# Patient Record
Sex: FEMALE | Race: WHITE | NOT HISPANIC OR LATINO | Employment: FULL TIME | URBAN - METROPOLITAN AREA
[De-identification: names, ages, dates, MRNs, and addresses within clinical notes are randomized per-mention and may not be internally consistent; named-entity substitution may affect disease eponyms.]

---

## 2019-09-03 ENCOUNTER — INITIAL PRENATAL (OUTPATIENT)
Dept: OBGYN | Facility: CLINIC | Age: 24
End: 2019-09-03
Payer: MEDICAID

## 2019-09-03 VITALS
HEIGHT: 60 IN | DIASTOLIC BLOOD PRESSURE: 64 MMHG | WEIGHT: 131 LBS | SYSTOLIC BLOOD PRESSURE: 98 MMHG | BODY MASS INDEX: 25.72 KG/M2

## 2019-09-03 DIAGNOSIS — Z34.81 ENCOUNTER FOR SUPERVISION OF NORMAL PREGNANCY IN MULTIGRAVIDA IN FIRST TRIMESTER: ICD-10-CM

## 2019-09-03 LAB
INT CON NEG: NEGATIVE
INT CON POS: POSITIVE
POC URINE PREGNANCY TEST: POSITIVE

## 2019-09-03 PROCEDURE — 76815 OB US LIMITED FETUS(S): CPT | Performed by: OBSTETRICS & GYNECOLOGY

## 2019-09-03 PROCEDURE — 8198 PREG CTR PKG RATE (SYSTEM): Performed by: OBSTETRICS & GYNECOLOGY

## 2019-09-03 PROCEDURE — 0500F INITIAL PRENATAL CARE VISIT: CPT | Mod: 25 | Performed by: OBSTETRICS & GYNECOLOGY

## 2019-09-03 PROCEDURE — 81025 URINE PREGNANCY TEST: CPT | Performed by: OBSTETRICS & GYNECOLOGY

## 2019-09-03 RX ORDER — PNV NO.95/FERROUS FUM/FOLIC AC 28MG-0.8MG
1 TABLET ORAL DAILY
Qty: 30 TAB | Refills: 11 | Status: ON HOLD | OUTPATIENT
Start: 2019-09-03 | End: 2019-10-18

## 2019-09-03 RX ORDER — FERROUS SULFATE 325(65) MG
325 TABLET ORAL 3 TIMES DAILY
Qty: 90 TAB | Refills: 4 | Status: ON HOLD | OUTPATIENT
Start: 2019-09-03 | End: 2019-10-18

## 2019-09-03 SDOH — HEALTH STABILITY: MENTAL HEALTH: HOW OFTEN DO YOU HAVE A DRINK CONTAINING ALCOHOL?: NEVER

## 2019-09-03 NOTE — PROGRESS NOTES
Pt. Here for  visit today.  # [phone]  First prenatal care  Pt. States no complaints   +FM   Pharmacy verified  Chaperone offered and not indicated

## 2019-09-03 NOTE — PROGRESS NOTES
Cc: Confirmation of pregnancy    HPI:  The patient is a 24 y.o.  Unknown based upon  No LMP recorded. Patient is pregnant.. She was using no birth control method. This was a planned pregnancy.     She presents for a confirmation of pregnancy.  She reports  fetal movement,  denies  vaginal bleeding,  denies  leakage of fluid,  denies contractions.   She denies nausea/vomiting, denies headache, and denies dysuria.      Review of systems:  Pertinent positives documented in HPI and all other systems reviewed & are negative    Gyn History: Menarche at 12 years old.  Regular cycles every month lasting 6 to 7 days.  Believes her last period was mid January however unsure.    Pregnancy related complications:  1.  Late to prenatal care    OB History    Para Term  AB Living   4 3 3     3   SAB TAB Ectopic Molar Multiple Live Births             3      # Outcome Date GA Lbr Amadou/2nd Weight Sex Delivery Anes PTL Lv   4 Current            3 Term 18 40w0d  2.722 kg (6 lb) F Vag-Spont  N MARTINA   2 Term 04/15/17 40w0d  3.175 kg (7 lb) M Vag-Spont  N MARTINA   1 Term 08/08/15 40w0d  2.722 kg (6 lb) F Vag-Spont  N MARTINA     History reviewed. No pertinent past medical history.  History reviewed. No pertinent surgical history.  Social History     Socioeconomic History   • Marital status: Single     Spouse name: Not on file   • Number of children: Not on file   • Years of education: Not on file   • Highest education level: Not on file   Occupational History   • Not on file   Social Needs   • Financial resource strain: Not on file   • Food insecurity:     Worry: Not on file     Inability: Not on file   • Transportation needs:     Medical: Not on file     Non-medical: Not on file   Tobacco Use   • Smoking status: Never Smoker   • Smokeless tobacco: Never Used   Substance and Sexual Activity   • Alcohol use: Never     Frequency: Never   • Drug use: Never   • Sexual activity: Not Currently     Partners: Male     Comment:  None    Lifestyle   • Physical activity:     Days per week: Not on file     Minutes per session: Not on file   • Stress: Not on file   Relationships   • Social connections:     Talks on phone: Not on file     Gets together: Not on file     Attends [employer]ism service: Not on file     Active member of club or organization: Not on file     Attends meetings of clubs or organizations: Not on file     Relationship status: Not on file   • Intimate partner violence:     Fear of current or ex partner: Not on file     Emotionally abused: Not on file     Physically abused: Not on file     Forced sexual activity: Not on file   Other Topics Concern   • Not on file   Social History Narrative   • Not on file     History reviewed. No pertinent family history.  Allergies:   Allergies as of 09/03/2019   • (No Known Allergies)       PE:    There were no vitals taken for this visit.      General:appears stated age  Head: normocephalic, non-tender  Neck: neck is supple  Abdomen: Bowel sounds positive, nondistended, soft, nontender x4, no rebound or guarding. No organomegaly. No masses.  Female GYN: deferred  Skin: No rashes, or ulcers or lesions seen  Psychiatric: Patient shows appropriate affect, is alert and oriented x3, intact judgment and insight.    transabdominal scan and per my read:    Indication: dating in pregnancy. Normal in the second week of Jan.     Findings: benitez intrauterine pregnancy. ADA is 14.4 cm.  Placenta is anterior.  Benitez pregnancy in vertex position.    BPD is 7.46 cm.  GA 29 weeks 6 days  HC is 29.3 to 9 cm.  GA 32 weeks 3 days.  AC is 29.43 cm.  GA 33 weeks 3 days.  FL is 6.3 cm.  EGA is 32.4 weeks.  Sex is likely male    Impression: Average gestational age is 32 weeks 1 day.  EDC by ultrasound today of 10/29/2019.    DATING: 10/29/2018 by late ultrasound as per ACOG guidelines    A/P:   1. Encounter for supervision of normal pregnancy in multigravida in first trimester  PRENATAL PROFILE 3 + HIV     US-OB 2ND 3RD TRI COMPLETE    GLUCOSE 1HR GESTATIONAL     Spent 20 minutes in face-to-face patient contact in which greater than 50% of that visit was spent in counseling/coordination of care of newly diagnosed pregnancy.  -Late prenatal care: Discussed too late for aneuploidy screening.  -1 hour GTT given today  -New OB labs given today  -Defer Pap smear to postpartum  -Anatomy scan today  SAB precautions discussed  F/u in 2 weeks for new OB visit  Increase water intake and encouraged healthy nutrition.  Begin prenatal vitamins and iron supplements as patient has a history of anemia in her previous pregnancies.

## 2019-09-10 ENCOUNTER — HOSPITAL ENCOUNTER (OUTPATIENT)
Dept: LAB | Facility: MEDICAL CENTER | Age: 24
End: 2019-09-10
Attending: OBSTETRICS & GYNECOLOGY
Payer: COMMERCIAL

## 2019-09-10 DIAGNOSIS — Z34.81 ENCOUNTER FOR SUPERVISION OF NORMAL PREGNANCY IN MULTIGRAVIDA IN FIRST TRIMESTER: ICD-10-CM

## 2019-09-10 LAB
ABO GROUP BLD: NORMAL
BASOPHILS # BLD AUTO: 0.4 % (ref 0–1.8)
BASOPHILS # BLD: 0.03 K/UL (ref 0–0.12)
BLD GP AB SCN SERPL QL: NORMAL
EOSINOPHIL # BLD AUTO: 0.04 K/UL (ref 0–0.51)
EOSINOPHIL NFR BLD: 0.5 % (ref 0–6.9)
ERYTHROCYTE [DISTWIDTH] IN BLOOD BY AUTOMATED COUNT: 39.6 FL (ref 35.9–50)
HBV SURFACE AG SER QL: NEGATIVE
HCT VFR BLD AUTO: 28.4 % (ref 37–47)
HGB BLD-MCNC: 8.9 G/DL (ref 12–16)
HIV 1+2 AB+HIV1 P24 AG SERPL QL IA: NON REACTIVE
IMM GRANULOCYTES # BLD AUTO: 0.03 K/UL (ref 0–0.11)
IMM GRANULOCYTES NFR BLD AUTO: 0.4 % (ref 0–0.9)
LYMPHOCYTES # BLD AUTO: 2.22 K/UL (ref 1–4.8)
LYMPHOCYTES NFR BLD: 28 % (ref 22–41)
MCH RBC QN AUTO: 26.8 PG (ref 27–33)
MCHC RBC AUTO-ENTMCNC: 31.3 G/DL (ref 33.6–35)
MCV RBC AUTO: 85.5 FL (ref 81.4–97.8)
MONOCYTES # BLD AUTO: 0.42 K/UL (ref 0–0.85)
MONOCYTES NFR BLD AUTO: 5.3 % (ref 0–13.4)
NEUTROPHILS # BLD AUTO: 5.2 K/UL (ref 2–7.15)
NEUTROPHILS NFR BLD: 65.4 % (ref 44–72)
NRBC # BLD AUTO: 0 K/UL
NRBC BLD-RTO: 0 /100 WBC
PLATELET # BLD AUTO: 204 K/UL (ref 164–446)
PMV BLD AUTO: 11.8 FL (ref 9–12.9)
RBC # BLD AUTO: 3.32 M/UL (ref 4.2–5.4)
RH BLD: NORMAL
RUBV AB SER QL: 20.4 IU/ML
TREPONEMA PALLIDUM IGG+IGM AB [PRESENCE] IN SERUM OR PLASMA BY IMMUNOASSAY: NON REACTIVE
WBC # BLD AUTO: 7.9 K/UL (ref 4.8–10.8)

## 2019-09-17 ENCOUNTER — TELEPHONE (OUTPATIENT)
Dept: OBGYN | Facility: CLINIC | Age: 24
End: 2019-09-17

## 2019-09-17 NOTE — TELEPHONE ENCOUNTER
09/17/19 10:51 AM    LM for pt to call back in regards to below. FYI in chart    ----- Message from Nevaeh Rogers C.N.M. sent at 9/11/2019  8:25 AM PDT -----  Please have patient start iron supplementation. 1 time daily for 1 week, then increase to 2 times daily

## 2019-10-18 ENCOUNTER — HOSPITAL ENCOUNTER (INPATIENT)
Facility: MEDICAL CENTER | Age: 24
LOS: 2 days | End: 2019-10-20
Attending: OBSTETRICS & GYNECOLOGY | Admitting: OBSTETRICS & GYNECOLOGY
Payer: MEDICAID

## 2019-10-18 LAB
A1 MICROGLOB PLACENTAL VAG QL: NEGATIVE
AMPHET UR QL SCN: NEGATIVE
BARBITURATES UR QL SCN: NEGATIVE
BASOPHILS # BLD AUTO: 0.3 % (ref 0–1.8)
BASOPHILS # BLD: 0.03 K/UL (ref 0–0.12)
BENZODIAZ UR QL SCN: NEGATIVE
BZE UR QL SCN: NEGATIVE
CANNABINOIDS UR QL SCN: NEGATIVE
EOSINOPHIL # BLD AUTO: 0.03 K/UL (ref 0–0.51)
EOSINOPHIL NFR BLD: 0.3 % (ref 0–6.9)
ERYTHROCYTE [DISTWIDTH] IN BLOOD BY AUTOMATED COUNT: 41.2 FL (ref 35.9–50)
HCT VFR BLD AUTO: 34 % (ref 37–47)
HGB BLD-MCNC: 10.5 G/DL (ref 12–16)
HOLDING TUBE BB 8507: NORMAL
IMM GRANULOCYTES # BLD AUTO: 0.03 K/UL (ref 0–0.11)
IMM GRANULOCYTES NFR BLD AUTO: 0.3 % (ref 0–0.9)
LYMPHOCYTES # BLD AUTO: 2 K/UL (ref 1–4.8)
LYMPHOCYTES NFR BLD: 16.9 % (ref 22–41)
MCH RBC QN AUTO: 25.2 PG (ref 27–33)
MCHC RBC AUTO-ENTMCNC: 30.9 G/DL (ref 33.6–35)
MCV RBC AUTO: 81.7 FL (ref 81.4–97.8)
METHADONE UR QL SCN: NEGATIVE
MONOCYTES # BLD AUTO: 0.52 K/UL (ref 0–0.85)
MONOCYTES NFR BLD AUTO: 4.4 % (ref 0–13.4)
NEUTROPHILS # BLD AUTO: 9.2 K/UL (ref 2–7.15)
NEUTROPHILS NFR BLD: 77.8 % (ref 44–72)
NRBC # BLD AUTO: 0 K/UL
NRBC BLD-RTO: 0 /100 WBC
OPIATES UR QL SCN: NEGATIVE
OXYCODONE UR QL SCN: NEGATIVE
PCP UR QL SCN: NEGATIVE
PLATELET # BLD AUTO: 232 K/UL (ref 164–446)
PMV BLD AUTO: 11.4 FL (ref 9–12.9)
PROPOXYPH UR QL SCN: NEGATIVE
RBC # BLD AUTO: 4.16 M/UL (ref 4.2–5.4)
WBC # BLD AUTO: 11.8 K/UL (ref 4.8–10.8)

## 2019-10-18 PROCEDURE — 304965 HCHG RECOVERY SERVICES

## 2019-10-18 PROCEDURE — 84112 EVAL AMNIOTIC FLUID PROTEIN: CPT

## 2019-10-18 PROCEDURE — 36415 COLL VENOUS BLD VENIPUNCTURE: CPT

## 2019-10-18 PROCEDURE — 700111 HCHG RX REV CODE 636 W/ 250 OVERRIDE (IP)

## 2019-10-18 PROCEDURE — A9270 NON-COVERED ITEM OR SERVICE: HCPCS | Performed by: OBSTETRICS & GYNECOLOGY

## 2019-10-18 PROCEDURE — 770002 HCHG ROOM/CARE - OB PRIVATE (112)

## 2019-10-18 PROCEDURE — 80307 DRUG TEST PRSMV CHEM ANLYZR: CPT

## 2019-10-18 PROCEDURE — 85025 COMPLETE CBC W/AUTO DIFF WBC: CPT

## 2019-10-18 PROCEDURE — 59409 OBSTETRICAL CARE: CPT

## 2019-10-18 PROCEDURE — 700102 HCHG RX REV CODE 250 W/ 637 OVERRIDE(OP): Performed by: NURSE PRACTITIONER

## 2019-10-18 PROCEDURE — A9270 NON-COVERED ITEM OR SERVICE: HCPCS | Performed by: NURSE PRACTITIONER

## 2019-10-18 PROCEDURE — 700111 HCHG RX REV CODE 636 W/ 250 OVERRIDE (IP): Performed by: OBSTETRICS & GYNECOLOGY

## 2019-10-18 PROCEDURE — 700102 HCHG RX REV CODE 250 W/ 637 OVERRIDE(OP): Performed by: OBSTETRICS & GYNECOLOGY

## 2019-10-18 PROCEDURE — 59409 OBSTETRICAL CARE: CPT | Performed by: NURSE PRACTITIONER

## 2019-10-18 RX ORDER — METHYLERGONOVINE MALEATE 0.2 MG/ML
0.2 INJECTION INTRAVENOUS
Status: DISCONTINUED | OUTPATIENT
Start: 2019-10-18 | End: 2019-10-20 | Stop reason: HOSPADM

## 2019-10-18 RX ORDER — VITAMIN A ACETATE, BETA CAROTENE, ASCORBIC ACID, CHOLECALCIFEROL, .ALPHA.-TOCOPHEROL ACETATE, DL-, THIAMINE MONONITRATE, RIBOFLAVIN, NIACINAMIDE, PYRIDOXINE HYDROCHLORIDE, FOLIC ACID, CYANOCOBALAMIN, CALCIUM CARBONATE, FERROUS FUMARATE, ZINC OXIDE, CUPRIC OXIDE 3080; 12; 120; 400; 1; 1.84; 3; 20; 22; 920; 25; 200; 27; 10; 2 [IU]/1; UG/1; MG/1; [IU]/1; MG/1; MG/1; MG/1; MG/1; MG/1; [IU]/1; MG/1; MG/1; MG/1; MG/1; MG/1
1 TABLET, FILM COATED ORAL EVERY MORNING
Status: DISCONTINUED | OUTPATIENT
Start: 2019-10-19 | End: 2019-10-20 | Stop reason: HOSPADM

## 2019-10-18 RX ORDER — SODIUM CHLORIDE, SODIUM LACTATE, POTASSIUM CHLORIDE, CALCIUM CHLORIDE 600; 310; 30; 20 MG/100ML; MG/100ML; MG/100ML; MG/100ML
INJECTION, SOLUTION INTRAVENOUS CONTINUOUS
Status: ACTIVE | OUTPATIENT
Start: 2019-10-18 | End: 2019-10-18

## 2019-10-18 RX ORDER — OXYCODONE HYDROCHLORIDE AND ACETAMINOPHEN 5; 325 MG/1; MG/1
1 TABLET ORAL EVERY 4 HOURS PRN
Status: DISCONTINUED | OUTPATIENT
Start: 2019-10-18 | End: 2019-10-20 | Stop reason: HOSPADM

## 2019-10-18 RX ORDER — IBUPROFEN 600 MG/1
600 TABLET ORAL EVERY 6 HOURS PRN
Status: DISCONTINUED | OUTPATIENT
Start: 2019-10-18 | End: 2019-10-20 | Stop reason: HOSPADM

## 2019-10-18 RX ORDER — SODIUM CHLORIDE, SODIUM LACTATE, POTASSIUM CHLORIDE, CALCIUM CHLORIDE 600; 310; 30; 20 MG/100ML; MG/100ML; MG/100ML; MG/100ML
INJECTION, SOLUTION INTRAVENOUS PRN
Status: DISCONTINUED | OUTPATIENT
Start: 2019-10-18 | End: 2019-10-20 | Stop reason: HOSPADM

## 2019-10-18 RX ORDER — METHYLERGONOVINE MALEATE 0.2 MG/ML
0.2 INJECTION INTRAVENOUS
Status: DISCONTINUED | OUTPATIENT
Start: 2019-10-18 | End: 2019-10-18 | Stop reason: HOSPADM

## 2019-10-18 RX ORDER — ONDANSETRON 4 MG/1
4 TABLET, ORALLY DISINTEGRATING ORAL EVERY 6 HOURS PRN
Status: CANCELLED | OUTPATIENT
Start: 2019-10-18

## 2019-10-18 RX ORDER — MISOPROSTOL 200 UG/1
800 TABLET ORAL
Status: DISCONTINUED | OUTPATIENT
Start: 2019-10-18 | End: 2019-10-18 | Stop reason: HOSPADM

## 2019-10-18 RX ORDER — DOCUSATE SODIUM 100 MG/1
100 CAPSULE, LIQUID FILLED ORAL 2 TIMES DAILY PRN
Status: DISCONTINUED | OUTPATIENT
Start: 2019-10-18 | End: 2019-10-20 | Stop reason: HOSPADM

## 2019-10-18 RX ORDER — ONDANSETRON 2 MG/ML
4 INJECTION INTRAMUSCULAR; INTRAVENOUS EVERY 6 HOURS PRN
Status: CANCELLED | OUTPATIENT
Start: 2019-10-18

## 2019-10-18 RX ORDER — IBUPROFEN 800 MG/1
800 TABLET ORAL EVERY 6 HOURS PRN
Status: DISCONTINUED | OUTPATIENT
Start: 2019-10-18 | End: 2019-10-20 | Stop reason: HOSPADM

## 2019-10-18 RX ORDER — HYDROCODONE BITARTRATE AND ACETAMINOPHEN 5; 325 MG/1; MG/1
1 TABLET ORAL EVERY 4 HOURS PRN
Status: DISCONTINUED | OUTPATIENT
Start: 2019-10-18 | End: 2019-10-20 | Stop reason: HOSPADM

## 2019-10-18 RX ORDER — BISACODYL 10 MG
10 SUPPOSITORY, RECTAL RECTAL PRN
Status: DISCONTINUED | OUTPATIENT
Start: 2019-10-18 | End: 2019-10-20 | Stop reason: HOSPADM

## 2019-10-18 RX ADMIN — Medication 125 ML/HR: at 16:53

## 2019-10-18 RX ADMIN — HYDROCODONE BITARTRATE AND ACETAMINOPHEN 1 TABLET: 5; 325 TABLET ORAL at 19:46

## 2019-10-18 RX ADMIN — Medication 2000 ML/HR: at 16:01

## 2019-10-18 RX ADMIN — IBUPROFEN 800 MG: 800 TABLET ORAL at 19:46

## 2019-10-18 ASSESSMENT — COPD QUESTIONNAIRES
HAVE YOU SMOKED AT LEAST 100 CIGARETTES IN YOUR ENTIRE LIFE: NO/DON'T KNOW
DURING THE PAST 4 WEEKS HOW MUCH DID YOU FEEL SHORT OF BREATH: NONE/LITTLE OF THE TIME
DO YOU EVER COUGH UP ANY MUCUS OR PHLEGM?: NO/ONLY WITH OCCASIONAL COLDS OR INFECTIONS
IN THE PAST 12 MONTHS DO YOU DO LESS THAN YOU USED TO BECAUSE OF YOUR BREATHING PROBLEMS: DISAGREE/UNSURE
COPD SCREENING SCORE: 0

## 2019-10-18 ASSESSMENT — PATIENT HEALTH QUESTIONNAIRE - PHQ9
SUM OF ALL RESPONSES TO PHQ9 QUESTIONS 1 AND 2: 0
2. FEELING DOWN, DEPRESSED, IRRITABLE, OR HOPELESS: NOT AT ALL
1. LITTLE INTEREST OR PLEASURE IN DOING THINGS: NOT AT ALL

## 2019-10-18 ASSESSMENT — LIFESTYLE VARIABLES
ALCOHOL_USE: NO
EVER_SMOKED: NEVER

## 2019-10-18 NOTE — PROGRESS NOTES
EDC - 10/29/19 EGA - 38.3    1012 - Pt arrived to labor and delivery for UCs since last night. Pt placed in room 220. External monitors in place X2. Category I FHT at this time. VSS. Pt reports good FM. Pt states she has had occ spotting when she wipes and watery discharge.   1016 SVE 4-80/-2. Watery pink discharge noted on glove. POC discussed with pt, all questions answered.   1020 OPAL Birch CNM notified of pt status, orders received for amnisure at this time.  1030 Amnisure collected. POC discussed with pt, all questions answered.  1115 OPAL Birch notified of amnisure negative.   1117 Pt notified amnisure negative, SVE /-2  1130 Pt up to walk the halls at this time. Pt instructed to return to room in 1 hour and we will recheck cervix at that time.  1233 SVE 5/-2  1245 OPAL Birch CNM notifed, orders to walk pt for another hour at this time.  1359 SVE 5-/-2 Dr. Bruce in department, notified of pt status  1406 Dr. Bruce at bedside SVE /-2 orders received for admission at this time  1420 IV started, labs drawn.  1435 Admission procedures completed at this time, 12 hour chart check completed.  1423 Dr. Bruce at bedside AROM clear fluid at this time /-1  1535 Pt states she is feeling pressure, SVE-unchanged  1552 Pt states the baby is coming, OPAL Birch and Dr. Estevez at bedside, SVE Complete/+2, Pt pushing at this time, RN at bedside directing pushing progress.  1556  of viable male infant by Dr. Estevez to maternal abdomen. Apgars 8/9    1601 Placenta delivered intact.  Perineal abrasion noted no repair required at this time.  1615 Fundus firm, lochia Light  1730 Pt ambulated to bathroom in stable condition at this time. Pt able to void. Bobbi pads changed.  1745 Pt transferred to  in stable condition via wheel chair with infant in arms. Report given to Sarita QUEZADA. Infant bands matched x2 cuddles active.

## 2019-10-18 NOTE — L&D DELIVERY NOTE
Vaginal Delivery Procedure Note:    Jennifer Cueto is a 24 y.o. , now Para 4     Weeks of gestation: 38w3d  Diagnosis: Admit for active labor,      of viable male infant at 15:56.  APGARs 8 and 9  Birth weight pending  Nuchal cord: 1x, tight, reduced  Placenta delivered spontaneously intact at 16:01. 3 Vessel cord.  Laceration: hemostatic abrasion to perineum at midline; otherwise no abrasions. No sutures required.  Excellent hemostasis.    Anesthesia - none  Patient tolerated procedure well.

## 2019-10-18 NOTE — CARE PLAN
Problem: Pain  Goal: Alleviation of Pain or a reduction in pain to the patient's comfort goal  Outcome: PROGRESSING AS EXPECTED  Note:   Pt denies need for pain intervention     Problem: Risk for Infection, Impaired Wound Healing  Goal: Remain free from signs and symptoms of infection  Outcome: PROGRESSING AS EXPECTED  Note:   No s/s of infection noted, pt remains afebrile

## 2019-10-18 NOTE — H&P
Obstetrics & Gynecology History & Physical Note    Date of Service  10/18/2019    Chief Complaint  Contractions    History of Presenting Illness  Jennifer Cueto is a 24 y.o.  at 38w3d 10/29/2019, by Ultrasound. No LMP recorded (lmp unknown). Patient is pregnant. She is being admitted for labor management.  Patient had a one outpatient visit.  She denies any bleeding or leaking.  Denies headaches or scotoma.  Reports irregular contractions.    Prenatal care: none      Patient Active Problem List    Diagnosis Date Noted   • Encounter for supervision of normal pregnancy in multigravida in first trimester 2019   No  prenatal care    Obstetric History  OB History    Para Term  AB Living   4 3 3     3   SAB TAB Ectopic Molar Multiple Live Births             3      # Outcome Date GA Lbr Amadou/2nd Weight Sex Delivery Anes PTL Lv   4 Current            3 Term 18 40w0d  2.722 kg (6 lb) F Vag-Spont  N MARTINA   2 Term 04/15/17 40w0d  3.175 kg (7 lb) M Vag-Spont  N MARTINA   1 Term 08/08/15 40w0d  2.722 kg (6 lb) F Vag-Spont  N MARTINA       Gynecologic History  Negative    Review of Systems  ROS all organ systems were reviewed and were negative except for complaints in HPI    Medical History   has no past medical history of Anemia, Anxiety, Arrhythmia, Arthritis, Asthma, Blood transfusion without reported diagnosis, Depression, Diabetes (HCC), GERD (gastroesophageal reflux disease), Hypertension, Kidney disease, Migraine, Seizure (HCC), Stroke ([facility]), or Thyroid disease.    Surgical History   has no past surgical history on file.     Family History  family history is not on file.     Social History   reports that she has never smoked. She has never used smokeless tobacco. She reports that she does not drink alcohol or use drugs.    Allergies  No Known Allergies    Medications  Prior to Admission Medications   Prescriptions Last Dose Informant Patient Reported? Taking?   Prenatal Vit-Fe Fumarate-FA (PRENATAL  VITAMIN) 27-0.8 MG Tab   No No   Sig: Take 1 Tab by mouth every day.   ferrous sulfate 325 (65 Fe) MG tablet   No No   Sig: Take 1 Tab by mouth 3 times a day for 150 days.      Facility-Administered Medications: None       Physical Exam  Vitals:    10/18/19 1014 10/18/19 1015   BP:  108/69   Pulse:  71   Resp: 18    Temp: 35.9 °C (96.7 °F)    TempSrc: Temporal    Weight: 63.5 kg (140 lb)    Height: 1.524 m (5')        General:   alert, cooperative, no distress   Skin:   normal   HEENT:  PERRLA and extraocular movements intact   Lungs:   CTA bilateral, clear to auscultation bilaterally   Heart:   regular rate and rhythm   Breasts:   no skin dimpling or peau d'orange, deferred   Abdomen:  Abdomen soft, non-tender; gravid.   Pelvis: External genitalia: normal general appearance   FHT:  130s BPM Fetal heart variability: moderate.  Fetal heart rate baseline is in the 130s with accelerations and no decelerations.contractions are 3 to 5 minutes apart   [place]: External US([id number])   Presentations: Vertex by SVE   Cervix:     Dilation: 6    Effacement: 90    Station:  -2    Consistency: Soft    Position: Posterior       Laboratory:  Prenatal Results     General (Most Recent Result)     Test Value Reference Range Date Time    ABO O   09/10/19 1031    Rh POS   09/10/19 1031    Antibody screen NEG   09/10/19 1031    HbA1c        Gonorrhea        Chlamydia  by PCR        Chlamydia by DNA        RPR/Syphilus Non Reactive  Non Reactive 09/10/19 1028    HSV 1/2 by PCR (non-serum)        HSV 1/2 (serum)        HSV 1        HSV 2        HPV (16)        HPV (18)        HPV (other)        HBsAg Negative  Negative 09/10/19 1028    HIV-1 HIV-2 Antibodies Non Reactive  Non Reactive 09/10/19 1028    Rubella 20.40 IU/mL  09/10/19 1028    Tb              Pap Smear (Most Recent Result)     Test Value Reference Range Date Time    Pap smear        Pap smear w/HPV        Pap smear w/CTNG        Pap smar w/HPV CTNG        Pap smear (reflex HPV  ACUS)        Pap smear (reflex HPV ASCUS w/CTNG)        Pathology gyn specimen              Urinalysis (Most Recent Result)     Test Value Reference Range Date Time    Urinalysis        Urinalysis (culture if indicated)        POC urinalysis        Urine drug screen  (See Report)    10/18/19 1230    Urine drug screen (w/o conf)        Urine culture ([id number])        Urine culture ([id number])              1st Trimester     Test Value Reference Range Date Time    Hgb        Hct        Fasting Glucose Tolerance        GTT, 1 hour        GTT, 2 hours        GTT, 3 hours              2nd Trimester     Test Value Reference Range Date Time    Hgb        Hct        Urinalysis        Urine Culture        AST        ALT        Uric Acid        Fasting Glucose Tolerance        GTT, 1 hour        GTT, 2 hours        GTT, 3 hours        Urine Protein/Creatinine Ratio              3rd Trimester     Test Value Reference Range Date Time    Hgb 8.9 g/dL 12.0 - 16.0 09/10/19 1028    Hct 28.4 % 37.0 - 47.0 09/10/19 1028    Platelet count 204 K/uL 164 - 446 09/10/19 1028    GBS (TORRES BROTH)        GBS (Direct)        Urinalysis        Urine Culture        Urine Drug Screen        Urine Protein/Creatinine Ratio              Congenital Disease Screening     Test Value Reference Range Date Time    First Trimester Screen        Quad Screen        Sickle Cell        Thalassemia        [facility]        Cystic Fibrosis Carrier Study                      Urinalysis:    No results found     Imaging:        Assessment:  24 y.o.  38w3d who presents with:  Active labor  No prenatal care/1 outpatient visit  Fetal heart rate tracing is category 1  GBS status unknown-no risk factors    Plan:  Will admit for labor management  Labor management and plan of care reviewed    Jim Burce M.D.

## 2019-10-19 LAB
ERYTHROCYTE [DISTWIDTH] IN BLOOD BY AUTOMATED COUNT: 41.7 FL (ref 35.9–50)
HCT VFR BLD AUTO: 26.2 % (ref 37–47)
HGB BLD-MCNC: 8.2 G/DL (ref 12–16)
MCH RBC QN AUTO: 25.9 PG (ref 27–33)
MCHC RBC AUTO-ENTMCNC: 31.3 G/DL (ref 33.6–35)
MCV RBC AUTO: 82.9 FL (ref 81.4–97.8)
PLATELET # BLD AUTO: 193 K/UL (ref 164–446)
PMV BLD AUTO: 11.9 FL (ref 9–12.9)
RBC # BLD AUTO: 3.16 M/UL (ref 4.2–5.4)
WBC # BLD AUTO: 10.9 K/UL (ref 4.8–10.8)

## 2019-10-19 PROCEDURE — 770002 HCHG ROOM/CARE - OB PRIVATE (112)

## 2019-10-19 PROCEDURE — 3E0234Z INTRODUCTION OF SERUM, TOXOID AND VACCINE INTO MUSCLE, PERCUTANEOUS APPROACH: ICD-10-PCS | Performed by: STUDENT IN AN ORGANIZED HEALTH CARE EDUCATION/TRAINING PROGRAM

## 2019-10-19 PROCEDURE — 700111 HCHG RX REV CODE 636 W/ 250 OVERRIDE (IP): Performed by: OBSTETRICS & GYNECOLOGY

## 2019-10-19 PROCEDURE — 3E02340 INTRODUCTION OF INFLUENZA VACCINE INTO MUSCLE, PERCUTANEOUS APPROACH: ICD-10-PCS | Performed by: STUDENT IN AN ORGANIZED HEALTH CARE EDUCATION/TRAINING PROGRAM

## 2019-10-19 PROCEDURE — A9270 NON-COVERED ITEM OR SERVICE: HCPCS | Performed by: OBSTETRICS & GYNECOLOGY

## 2019-10-19 PROCEDURE — 700111 HCHG RX REV CODE 636 W/ 250 OVERRIDE (IP)

## 2019-10-19 PROCEDURE — 90686 IIV4 VACC NO PRSV 0.5 ML IM: CPT | Performed by: OBSTETRICS & GYNECOLOGY

## 2019-10-19 PROCEDURE — 85027 COMPLETE CBC AUTOMATED: CPT

## 2019-10-19 PROCEDURE — 700102 HCHG RX REV CODE 250 W/ 637 OVERRIDE(OP): Performed by: OBSTETRICS & GYNECOLOGY

## 2019-10-19 PROCEDURE — 700102 HCHG RX REV CODE 250 W/ 637 OVERRIDE(OP): Performed by: NURSE PRACTITIONER

## 2019-10-19 PROCEDURE — 90471 IMMUNIZATION ADMIN: CPT

## 2019-10-19 PROCEDURE — 90715 TDAP VACCINE 7 YRS/> IM: CPT

## 2019-10-19 PROCEDURE — 36415 COLL VENOUS BLD VENIPUNCTURE: CPT

## 2019-10-19 PROCEDURE — A9270 NON-COVERED ITEM OR SERVICE: HCPCS | Performed by: NURSE PRACTITIONER

## 2019-10-19 RX ORDER — IBUPROFEN 800 MG/1
800 TABLET ORAL EVERY 6 HOURS PRN
Qty: 90 TAB | Refills: 0 | Status: SHIPPED | OUTPATIENT
Start: 2019-10-19 | End: 2021-05-13

## 2019-10-19 RX ORDER — PSEUDOEPHEDRINE HCL 30 MG
100 TABLET ORAL 2 TIMES DAILY PRN
Qty: 60 CAP | Refills: 0 | Status: SHIPPED | OUTPATIENT
Start: 2019-10-19 | End: 2021-06-15

## 2019-10-19 RX ADMIN — VITAMIN A, VITAMIN C, VITAMIN D, VITAMIN E, THIAMINE, RIBOFLAVIN, NIACIN, VITAMIN B6, FOLIC ACID, VITAMIN B12, CALCIUM, IRON, ZINC, COPPER 1 TABLET: 4000; 120; 400; 22; 1.84; 3; 20; 10; 1; 12; 200; 27; 25; 2 TABLET ORAL at 05:42

## 2019-10-19 RX ADMIN — HYDROCODONE BITARTRATE AND ACETAMINOPHEN 1 TABLET: 5; 325 TABLET ORAL at 20:42

## 2019-10-19 RX ADMIN — INFLUENZA A VIRUS A/BRISBANE/02/2018 IVR-190 (H1N1) ANTIGEN (FORMALDEHYDE INACTIVATED), INFLUENZA A VIRUS A/KANSAS/14/2017 X-327 (H3N2) ANTIGEN (FORMALDEHYDE INACTIVATED), INFLUENZA B VIRUS B/PHUKET/3073/2013 ANTIGEN (FORMALDEHYDE INACTIVATED), AND INFLUENZA B VIRUS B/MARYLAND/15/2016 BX-69A ANTIGEN (FORMALDEHYDE INACTIVATED) 0.5 ML: 15; 15; 15; 15 INJECTION, SUSPENSION INTRAMUSCULAR at 05:42

## 2019-10-19 RX ADMIN — TETANUS TOXOID, REDUCED DIPHTHERIA TOXOID AND ACELLULAR PERTUSSIS VACCINE, ADSORBED 0.5 ML: 5; 2.5; 8; 8; 2.5 SUSPENSION INTRAMUSCULAR at 05:42

## 2019-10-19 RX ADMIN — IBUPROFEN 800 MG: 800 TABLET ORAL at 20:42

## 2019-10-19 ASSESSMENT — EDINBURGH POSTNATAL DEPRESSION SCALE (EPDS)
I HAVE BEEN ABLE TO LAUGH AND SEE THE FUNNY SIDE OF THINGS: AS MUCH AS I ALWAYS COULD
I HAVE FELT SAD OR MISERABLE: NO, NOT AT ALL
I HAVE BEEN ANXIOUS OR WORRIED FOR NO GOOD REASON: NO, NOT AT ALL
I HAVE BEEN SO UNHAPPY THAT I HAVE BEEN CRYING: NO, NEVER
I HAVE BLAMED MYSELF UNNECESSARILY WHEN THINGS WENT WRONG: YES, SOME OF THE TIME
I HAVE LOOKED FORWARD WITH ENJOYMENT TO THINGS: AS MUCH AS I EVER DID
I HAVE BEEN SO UNHAPPY THAT I HAVE HAD DIFFICULTY SLEEPING: NOT AT ALL
I HAVE FELT SCARED OR PANICKY FOR NO GOOD REASON: NO, NOT AT ALL
THE THOUGHT OF HARMING MYSELF HAS OCCURRED TO ME: NEVER
THINGS HAVE BEEN GETTING ON TOP OF ME: NO, I HAVE BEEN COPING AS WELL AS EVER

## 2019-10-19 NOTE — PROGRESS NOTES
Assumed pt care at 0715.  Received report from james RN.  Assessment completed.  Pt AAOx4.  Pt has no comlaints of pain at this time.  No other s/s of discomfort or distress. Call light within reach and staff numbers provided.  Pt needs met at this time.

## 2019-10-19 NOTE — CARE PLAN
Problem: Altered physiologic condition related to immediate post-delivery state and potential for bleeding/hemorrhage  Goal: Patient physiologically stable as evidenced by normal lochia, palpable uterine involution and vital signs within normal limits  Outcome: PROGRESSING AS EXPECTED  Note:   Fundus firm at U, lochia rubra minimal. V/S WNL     Problem: Alteration in comfort related to episiotomy, vaginal repair and/or after birth pains  Goal: Patient verbalizes acceptable pain level  Outcome: PROGRESSING AS EXPECTED  Note:   Pain controlled at this time. Will be medicated as needed.

## 2019-10-19 NOTE — PROGRESS NOTES
Bedside report done, patient in bed socializing with visitors. Denies pain at this time. Will continue to monitor.

## 2019-10-19 NOTE — LACTATION NOTE
Met with MOB for a lactation follow up visit.  Lactation assistance offered, but MOB declined.  MOB stated she plans on feeding infant both breast milk and formula once she is at home due to her busy schedule caring for her older three children and infant.  MOB denied pain with latch and stated has no tissue damage to nipples and areolas from breastfeeding.      Discussed the effect of supply and demand on milk production.     MOB again encouraged to follow up with WIC regarding any lactation questions and/or concerns post discharge.

## 2019-10-19 NOTE — CONSULTS
"Met with MOB for an initial lactation visit.  MOB delivered her fourth baby today at 1556 at 38.3 weeks gestation.  Infant is approximately 5 hours old.  MOB reported breast fed her 2nd and 3rd child for approximately 5-6 months each.  MOB denied having a history of low milk supply.  Lactation assistance offered, but MOB declined.  MOB stated infant has latched onto the breast since delivery, but fed for a short time only before falling asleep.  MOB denied pain with latch.    Discussed what to expect with breastfeeding in the first 24 hours following delivery.      MOB reported she can perform hand expression independently.    Provided MOB with \"Getting To Know Your Baby\" pamphlet.    MOB stated she has WIC.  MOB informed of the outpatient lactation assistance available to her through [facility], the [facility] and the [facility] [place].    Breastfeeding Plan:  Offer infant the breast on demand per feeding cues and within 3 hours from the last feed for a minimum of 8-12 times in a 24 hour period.  If infant unable to latch onto the breast between now and when infant turns 24 hours old, MOB encouraged to hand express colostrum onto a spoon and feed back expressed breast milk to infant.    MOB verbalized understanding of all information provided to her and denied having any further questions at this time.  Encouraged MOB to call for lactation assistance as needed.  "

## 2019-10-19 NOTE — PROGRESS NOTES
Assessment completed.  Pt AAOx4.  Pt has no comlaints of pain at this time.  No other s/s of discomfort or distress. Call light within reach and staff numbers provided.  Pt needs met at this time.

## 2019-10-19 NOTE — DISCHARGE PLANNING
Discharge Planning Assessment Post Partum    Reason for Referral: 1 prenatal visit  Address: [facility] [address] [place] [address]  Phone: [phone]  Type of Living Situation: living with FOB and children  Mom Diagnosis: Pregnancy  Baby Diagnosis: [place]  Primary Language: MOB speaks English    Name of Baby: Undecided, waiting on FOB to get here to name baby (FOB is currently at home caring for other children)  Father of the Baby: Uvaldo Roberts   Involved in baby’s care? Yes  FOANABELL is employed at [employer]    Prenatal Care: 1 prenatal visit at [facility].  States she was busy with her other kids which made it difficult to get to her appointments.  Verified that she does have transportation.  Mom's PCP: None  PCP for new baby: Pediatrician list provided    Support System: FOB and family  Coping/Bonding between mother & baby: Yes  Source of Feeding: breast feeding  Supplies for Infant: prepared for infant except for a car seat.  Provided information to the [place] [facility]    Mom's Insurance: Medicaid FFS  Baby Covered on Insurance:Yes  Mother Employed/School: Not currently, FOB is working  Other children in the home/names & ages: MOB has four other children; ages 4, 2, and 1    Financial Hardship/Income: denies, FOB is working  Mom's Mental status: alert and oriented  Services used prior to admit: Medicaid, TANF, WIC, and food stamps    CPS History: denies  Psychiatric History: denies  Domestic Violence History: denies  Drug/ETOH History: denies, MOB and infant's UDS are both negative    Resources Provided: pediatrician list, children and family resource list, and counseling resources for post partum depression  Referrals Made: diaper bank referral provided     Clearance for Discharge: Infant is cleared to discharge home with MOB

## 2019-10-19 NOTE — CARE PLAN
Problem: Safety  Goal: Will remain free from injury  Note:   Call light within reach, treaded socks in place, bed in lowest position and locked.  Hourly rounding in progress.       Problem: Infection  Goal: Will remain free from infection  Note:   No s/sx of infection

## 2019-10-19 NOTE — DISCHARGE SUMMARY
Discharge Summary:      Jennifer Cueto    Admit Date:   10/18/2019  Discharge Date:  10/19/2019     Admitting diagnosis:  Delivery  Labor and delivery indication for care or intervention  Discharge Diagnosis: Status post vaginal, spontaneous.  Pregnancy Complications: No PNC  Tubal Ligation:  no        History:  History reviewed. No pertinent past medical history.  OB History    Para Term  AB Living   4 4 4     4   SAB TAB Ectopic Molar Multiple Live Births           0 4      # Outcome Date GA Lbr Amadou/2nd Weight Sex Delivery Anes PTL Lv   4 Term 10/18/19 38w3d / 00:04 3.16 kg (6 lb 15.5 oz) M Vag-Spont None N MARTINA   3 Term 18 40w0d  2.722 kg (6 lb) F Vag-Spont  N MARTINA   2 Term 04/15/17 40w0d  3.175 kg (7 lb) M Vag-Spont  N MARTINA   1 Term 08/08/15 40w0d  2.722 kg (6 lb) F Vag-Spont  N MARTINA        Patient has no known allergies.  Patient Active Problem List    Diagnosis Date Noted   • Encounter for supervision of normal pregnancy in multigravida in first trimester 2019        Hospital Course:   24 y.o. , now para 4, was admitted with no prenatal care and with the above mentioned diagnosis, underwent Active Labor, vaginal, spontaneous without complication. Patient postpartum course was unremarkable, with progressive advancement in diet , ambulation and toleration of oral analgesia. Patient without complaints today and desires discharge.      Reports minimal vaginal bleeding, denies any pain. Voiding without difficulty, ambulating without difficulty. Tolerating diet and liquids. Has not had a BM or passed gas yet.    Vitals:    10/18/19 1647 10/18/19 1817 10/18/19 2300 10/19/19 0200   BP: 106/57 101/54 (!) 97/58 (!) 96/52   Pulse: 75 90 68 65   Resp:     Temp:  36.6 °C (97.9 °F) 36.8 °C (98.2 °F) 36.8 °C (98.3 °F)   TempSrc:  Temporal Temporal Temporal   SpO2:  97% 96% 97%   Weight:       Height:           Current Facility-Administered Medications   Medication Dose   • Influenza  Vaccine Quad pf injection 0.5 mL  0.5 mL   • oxytocin (PITOCIN) infusion (for postpartum)   mL/hr   • ibuprofen (MOTRIN) tablet 600 mg  600 mg   • HYDROcodone-acetaminophen (NORCO) 5-325 MG per tablet 1 Tab  1 Tab   • LR infusion     • PRN oxytocin (PITOCIN) (20 Units/1000 mL) PRN for excessive uterine bleeding - See Admin Instr  125-999 mL/hr   • methylergonovine (METHERGINE) injection 0.2 mg  0.2 mg   • docusate sodium (COLACE) capsule 100 mg  100 mg   • bisacodyl (DULCOLAX) suppository 10 mg  10 mg   • magnesium hydroxide (MILK OF MAGNESIA) suspension 30 mL  30 mL   • prenatal plus vitamin (STUARTNATAL 1+1) 27-1 MG tablet 1 Tab  1 Tab   • oxyCODONE-acetaminophen (PERCOCET) 5-325 MG per tablet 1 Tab  1 Tab   • ibuprofen (MOTRIN) tablet 800 mg  800 mg       Exam:  Breast Exam: Nontender, no bleeding or discharge  Abdomen: Abdomen soft, non-tender. BS normal. No masses,  No organomegaly  Fundus Non Tender: yes  Incision: none  Perineum: perineum intact  Extremity: extremities, peripheral pulses and reflexes normal     Labs:  Recent Labs     10/18/19  1420   WBC 11.8*   RBC 4.16*   HEMOGLOBIN 10.5*   HEMATOCRIT 34.0*   MCV 81.7   MCH 25.2*   MCHC 30.9*   RDW 41.2   PLATELETCT 232   MPV 11.4        Activity:   Discharge to home  Pelvic Rest x 6 weeks    Assessment:  normal postpartum course  Discharge Assessment: Taking adequate diet and fluids, ambulating without difficulty, voiding without difficulty, minimal bleeding.     Follow up: .[facility]  in 5 weeks for vaginal.      Discharge Meds:   Current Outpatient Medications   Medication Sig Dispense Refill   • docusate sodium 100 MG Cap Take 100 mg by mouth 2 times a day as needed for Constipation. 60 Cap 0   • ibuprofen (MOTRIN) 800 MG Tab Take 1 Tab by mouth every 6 hours as needed. 90 Tab 0       Rogers Cyr M.D.      Addendum 10/20/2019:  Per report, Discharge orders yesterday were canceled because infant kept 48h due to GBS unknown.  All the above  documentation is still accurate, confirmed by myself today as well as the following:     S: Pt reports pain is well managed, she is ambulating without difficulty, voiding without difficulty, tolerating normal diet and liquids.  She denies vaginal bleeding, dizziness with ambulation.  Denies headaches, CP, SOB, abdominal pain, dysuria, hematuria, hematochezia, significant LE swelling.     O:  Vitals:    10/19/19 1800   BP: (!) 99/64   Pulse: 68   Resp: 18   Temp: 36.9 °C (98.5 °F)   SpO2: 97%         PE  General: Well appearing, NAD  HEENT: NC/AT, EOMI, PERRL, OP clear with no erythema  CV: Normal S1/S2, RRR, no m/r/g  Pulm: Symmetric inspiratory effort, CTAB with no adventitious sounds  Abd: soft, nontender, nondistended. Uterine fundus nontender.  Ext: no LE edema, radial pulses 2+     F/U at [facility] in 5 week for vaginal delivery.

## 2019-10-19 NOTE — CARE PLAN
Problem: Safety  Goal: Will remain free from injury  10/19/2019 0745 by Sarita Owen, R.N.  Note:   Call light within reach, treaded socks in place, bed in lowest position and locked.  Hourly rounding in progress.    10/18/2019 1806 by Sarita Owen R.N.  Note:   Call light within reach, treaded socks in place, bed in lowest position and locked.  Hourly rounding in progress.       Problem: Infection  Goal: Will remain free from infection  10/19/2019 0745 by Sarita Oewn, R.N.  Note:   No s/sx of infection  10/18/2019 1806 by Sarita Owen, R.N.  Note:   No s/sx of infection

## 2019-10-20 VITALS
BODY MASS INDEX: 27.48 KG/M2 | OXYGEN SATURATION: 97 % | DIASTOLIC BLOOD PRESSURE: 56 MMHG | HEIGHT: 60 IN | TEMPERATURE: 97.2 F | HEART RATE: 62 BPM | RESPIRATION RATE: 17 BRPM | SYSTOLIC BLOOD PRESSURE: 109 MMHG | WEIGHT: 140 LBS

## 2019-10-20 PROCEDURE — 700102 HCHG RX REV CODE 250 W/ 637 OVERRIDE(OP): Performed by: NURSE PRACTITIONER

## 2019-10-20 PROCEDURE — A9270 NON-COVERED ITEM OR SERVICE: HCPCS | Performed by: NURSE PRACTITIONER

## 2019-10-20 RX ADMIN — IBUPROFEN 800 MG: 800 TABLET ORAL at 08:39

## 2019-10-20 RX ADMIN — VITAMIN A, VITAMIN C, VITAMIN D, VITAMIN E, THIAMINE, RIBOFLAVIN, NIACIN, VITAMIN B6, FOLIC ACID, VITAMIN B12, CALCIUM, IRON, ZINC, COPPER 1 TABLET: 4000; 120; 400; 22; 1.84; 3; 20; 10; 1; 12; 200; 27; 25; 2 TABLET ORAL at 05:53

## 2019-10-20 NOTE — LACTATION NOTE
Met with MOB for a lactation follow up visit and MOB stated infant continues to latch onto the breast without difficulty and is feeding well.  MOB reported infant has been cluster feeding and explained to MOB the importance of cluster feeding to her milk supply.  Infant's weight loss since birth (5.69%) and voiding/stooling pattern remains within defined limits.    Lactation assistance offered, but MOB declined.  MOB continues to deny pain and tissue damage to nipples and areolas with latch.

## 2019-10-20 NOTE — PROGRESS NOTES
0700 Received bedside report from DAMIEN Velazquez. Discussed plan of care. Patient will call for pain medication as needed. All needs met at this time.

## 2019-10-20 NOTE — CARE PLAN
Problem: Altered physiologic condition related to immediate post-delivery state and potential for bleeding/hemorrhage  Goal: Patient physiologically stable as evidenced by normal lochia, palpable uterine involution and vital signs within normal limits  Outcome: PROGRESSING AS EXPECTED  Note:   Fundus firm at U, lochia rubra minimal. V/S stable.     Problem: Potential for postpartum infection related to presence of episiotomy/vaginal tear and/or uterine contamination  Goal: Patient will be absent from signs and symptoms of infection  Outcome: PROGRESSING AS EXPECTED  Note:   Patient has no signs of infection noted at this time.

## 2019-10-20 NOTE — DISCHARGE INSTRUCTIONS
POSTPARTUM DISCHARGE INSTRUCTIONS FOR MOM    YOB: 1995   Age: 24 y.o.               Admit Date: 10/18/2019     Discharge Date: 10/20/2019  Attending Doctor:  Jim Bruce M.D.                  Allergies:  Patient has no known allergies.    Discharged to home by car. Discharged via wheelchair, hospital escort: Yes.  Special equipment needed: Not Applicable  Belongings with: Personal  Be sure to schedule a follow-up appointment with your primary care doctor or any specialists as instructed.     Discharge Plan:   Diet Plan: Discussed  Activity Level: Discussed  Confirmed Follow up Appointment: Patient to Call and Schedule Appointment  Confirmed Symptoms Management: Discussed  Medication Reconciliation Updated: Yes  Influenza Vaccine Indication: Indicated: 9 to 64 years of age  Influenza Vaccine Given - only chart on this line when given: Influenza Vaccine Given (See MAR)    REASONS TO CALL YOUR OBSTETRICIAN:  1.   Persistent fever or shaking chills (Temperature higher than 100.4)  2.   Heavy bleeding (soaking more than 1 pad per hour); Passing clots  3.   Foul odor from vagina  4.   Mastitis (Breast infection; breast pain, chills, fever, redness)  5.   Urinary pain, burning or frequency  6.   Episiotomy infection  7.   Abdominal incision infection  8.   Severe depression longer than 24 hours    HAND WASHING  · Prior to handling the baby.  · Before breastfeeding or bottle feeding baby.  · After using the bathroom or changing the baby's diaper.    WOUND CARE  Ask your physician for additional care instructions.  In general:      VAGINAL CARE  · Nothing inside vagina for 6 weeks: no sexual intercourse, tampons or douching.  · Bleeding may continue for 2-4 weeks.  Amount may vary.    · Call your physician for heavy bleeding which means soaking more than 1 pad per hour    BIRTH CONTROL  · It is possible to become pregnant at any time after delivery and while breastfeeding.  · Plan to discuss a method of birth  "control with your physician at your follow up visit. visit.    DIET AND ELIMINATION  · Eating more fiber (bran cereal, fruits, and vegetables) and drinking plenty of fluids will help to avoid constipation.  · Urinary frequency after childbirth is normal.    POSTPARTUM BLUES  During the first few days after birth, you may experience a sense of the \"blues\" which may include impatience, irritability or even crying.  These feeling come and go quickly.  However, as many as 1 in 10 women experience emotional symptoms known as postpartum depression.    Postpartum depression:  May start as early as the second or third day after delivery or take several weeks or months to develop.  Symptoms of \"blues\" are present, but are more intense:  Crying spells; loss of appetite; feelings of hopelessness or loss of control; fear of touching the baby; over concern or no concern at all about the baby; little or no concern about your own appearance/caring for yourself; and/or inability to sleep or excessive sleeping.  Contact your physician if you are experiencing any of these symptoms.    Crisis Hotline:  · [place] Crisis Hotline:  0-862-KACIJTV  Or 1-834.201.5002  · [place] Crisis Hotline:  1-781.910.6106  Or 371-555-6069    PREVENTING SHAKEN BABY:  If you are angry or stressed, PUT THE BABY IN THE CRIB, step away, take some deep breaths, and wait until you are calm to care for the baby.  DO NOT SHAKE THE BABY.  You are not alone, call a supporter for help.    · Crisis Call Center 24/7 crisis line 188-686-9614 or 1-596.216.6033  · You can also text them, text \"ANSWER\" to 050416    QUIT SMOKING/TOBACCO USE:  I understand the use of any tobacco products increases my chance of suffering from future heart disease and could cause other illnesses which may shorten my life. Quitting the use of tobacco products is the single most important thing I can do to improve my health. For further information on smoking / tobacco cessation call a Toll " Free Quit Line at 1-272.936.2667 (*National [employer] [place]) or 1-846.646.3778 (American Lung Association) or you can access the web based program at www.lungusa.org.    · [place] Tobacco Users Help Line:  (345) 774-6362       Toll Free: 1-985.967.6321  · Quit Tobacco Program [facility] (360)096-2956    DEPRESSION / SUICIDE RISK:  As you are discharged from this [facility], it is important to learn how to keep safe from harming yourself.    Recognize the warning signs:  · Abrupt changes in personality, positive or negative- including increase in energy   · Giving away possessions  · Change in eating patterns- significant weight changes-  positive or negative  · Change in sleeping patterns- unable to sleep or sleeping all the time   · Unwillingness or inability to communicate  · Depression  · Unusual sadness, discouragement and loneliness  · Talk of wanting to die  · Neglect of personal appearance   · Rebelliousness- reckless behavior  · Withdrawal from people/activities they love  · Confusion- inability to concentrate     If you or a loved one observes any of these behaviors or has concerns about self-harm, here's what you can do:  · Talk about it- your feelings and reasons for harming yourself  · Remove any means that you might use to hurt yourself (examples: pills, rope, extension cords, firearm)  · Get professional help from the community (Mental Health, Substance Abuse, psychological counseling)  · Do not be alone:Call your Safe Contact- someone whom you trust who will be there for you.  · Call your local CRISIS HOTLINE 393-7309 or 083-040-5142  · Call your local Children's Mobile Crisis Response Team [place] (587) 430-7376 or www.Soxiable  · Call the toll free National Suicide Prevention Hotlines   · National Suicide Prevention Lifeline 056-009-CGWX (2498)  · National Hope Line Network 800-SUICIDE (176-9461)    DISCHARGE SURVEY:  Thank you for choosing [facility].  We hope we provided you with very good care.  You may be receiving a survey in the mail.  Please fill it out.  Your opinion is valuable to us.    ADDITIONAL EDUCATIONAL MATERIALS GIVEN TO PATIENT:        My signature on this form indicates that:  1.  I have reviewed and understand the above information  2.  My questions regarding this information have been answered to my satisfaction.  3.  I have formulated a plan with my discharge nurse to obtain my prescribed medication for home.

## 2021-03-04 ENCOUNTER — APPOINTMENT (OUTPATIENT)
Dept: RADIOLOGY | Facility: MEDICAL CENTER | Age: 26
End: 2021-03-04
Attending: EMERGENCY MEDICINE

## 2021-03-04 ENCOUNTER — HOSPITAL ENCOUNTER (EMERGENCY)
Facility: MEDICAL CENTER | Age: 26
End: 2021-03-04
Attending: EMERGENCY MEDICINE

## 2021-03-04 VITALS
DIASTOLIC BLOOD PRESSURE: 53 MMHG | BODY MASS INDEX: 22.39 KG/M2 | HEIGHT: 62 IN | OXYGEN SATURATION: 98 % | TEMPERATURE: 98.3 F | HEART RATE: 65 BPM | WEIGHT: 121.69 LBS | SYSTOLIC BLOOD PRESSURE: 99 MMHG | RESPIRATION RATE: 16 BRPM

## 2021-03-04 DIAGNOSIS — Z34.90 PREGNANCY, UNSPECIFIED GESTATIONAL AGE: ICD-10-CM

## 2021-03-04 LAB
ALBUMIN SERPL BCP-MCNC: 4.1 G/DL (ref 3.2–4.9)
ALBUMIN/GLOB SERPL: 1.2 G/DL
ALP SERPL-CCNC: 96 U/L (ref 30–99)
ALT SERPL-CCNC: 9 U/L (ref 2–50)
ANION GAP SERPL CALC-SCNC: 10 MMOL/L (ref 7–16)
APPEARANCE UR: ABNORMAL
AST SERPL-CCNC: 12 U/L (ref 12–45)
B-HCG SERPL-ACNC: ABNORMAL MIU/ML (ref 0–5)
BACTERIA #/AREA URNS HPF: ABNORMAL /HPF
BASOPHILS # BLD AUTO: 0.2 % (ref 0–1.8)
BASOPHILS # BLD: 0.02 K/UL (ref 0–0.12)
BILIRUB SERPL-MCNC: 0.2 MG/DL (ref 0.1–1.5)
BILIRUB UR QL STRIP.AUTO: NEGATIVE
BUN SERPL-MCNC: 7 MG/DL (ref 8–22)
CALCIUM SERPL-MCNC: 9.5 MG/DL (ref 8.5–10.5)
CHLORIDE SERPL-SCNC: 101 MMOL/L (ref 96–112)
CO2 SERPL-SCNC: 20 MMOL/L (ref 20–33)
COLOR UR: YELLOW
CREAT SERPL-MCNC: 0.44 MG/DL (ref 0.5–1.4)
EOSINOPHIL # BLD AUTO: 0.05 K/UL (ref 0–0.51)
EOSINOPHIL NFR BLD: 0.6 % (ref 0–6.9)
EPI CELLS #/AREA URNS HPF: NEGATIVE /HPF
ERYTHROCYTE [DISTWIDTH] IN BLOOD BY AUTOMATED COUNT: 41.7 FL (ref 35.9–50)
GLOBULIN SER CALC-MCNC: 3.5 G/DL (ref 1.9–3.5)
GLUCOSE SERPL-MCNC: 86 MG/DL (ref 65–99)
GLUCOSE UR STRIP.AUTO-MCNC: NEGATIVE MG/DL
HCT VFR BLD AUTO: 35.4 % (ref 37–47)
HGB BLD-MCNC: 11.9 G/DL (ref 12–16)
HYALINE CASTS #/AREA URNS LPF: ABNORMAL /LPF
IMM GRANULOCYTES # BLD AUTO: 0.02 K/UL (ref 0–0.11)
IMM GRANULOCYTES NFR BLD AUTO: 0.2 % (ref 0–0.9)
KETONES UR STRIP.AUTO-MCNC: NEGATIVE MG/DL
LEUKOCYTE ESTERASE UR QL STRIP.AUTO: NEGATIVE
LYMPHOCYTES # BLD AUTO: 2.34 K/UL (ref 1–4.8)
LYMPHOCYTES NFR BLD: 29 % (ref 22–41)
MCH RBC QN AUTO: 29 PG (ref 27–33)
MCHC RBC AUTO-ENTMCNC: 33.6 G/DL (ref 33.6–35)
MCV RBC AUTO: 86.1 FL (ref 81.4–97.8)
MICRO URNS: ABNORMAL
MONOCYTES # BLD AUTO: 0.47 K/UL (ref 0–0.85)
MONOCYTES NFR BLD AUTO: 5.8 % (ref 0–13.4)
NEUTROPHILS # BLD AUTO: 5.18 K/UL (ref 2–7.15)
NEUTROPHILS NFR BLD: 64.2 % (ref 44–72)
NITRITE UR QL STRIP.AUTO: NEGATIVE
NRBC # BLD AUTO: 0 K/UL
NRBC BLD-RTO: 0 /100 WBC
PH UR STRIP.AUTO: 6.5 [PH] (ref 5–8)
PLATELET # BLD AUTO: 278 K/UL (ref 164–446)
PMV BLD AUTO: 11.1 FL (ref 9–12.9)
POTASSIUM SERPL-SCNC: 3.3 MMOL/L (ref 3.6–5.5)
PROT SERPL-MCNC: 7.6 G/DL (ref 6–8.2)
PROT UR QL STRIP: NEGATIVE MG/DL
RBC # BLD AUTO: 4.11 M/UL (ref 4.2–5.4)
RBC # URNS HPF: ABNORMAL /HPF
RBC UR QL AUTO: ABNORMAL
SODIUM SERPL-SCNC: 131 MMOL/L (ref 135–145)
SP GR UR STRIP.AUTO: 1.01
UROBILINOGEN UR STRIP.AUTO-MCNC: 0.2 MG/DL
WBC # BLD AUTO: 8.1 K/UL (ref 4.8–10.8)
WBC #/AREA URNS HPF: ABNORMAL /HPF

## 2021-03-04 PROCEDURE — 84702 CHORIONIC GONADOTROPIN TEST: CPT

## 2021-03-04 PROCEDURE — 80053 COMPREHEN METABOLIC PANEL: CPT

## 2021-03-04 PROCEDURE — 81001 URINALYSIS AUTO W/SCOPE: CPT

## 2021-03-04 PROCEDURE — 76801 OB US < 14 WKS SINGLE FETUS: CPT

## 2021-03-04 PROCEDURE — 700105 HCHG RX REV CODE 258: Performed by: EMERGENCY MEDICINE

## 2021-03-04 PROCEDURE — 85025 COMPLETE CBC W/AUTO DIFF WBC: CPT

## 2021-03-04 PROCEDURE — 76816 OB US FOLLOW-UP PER FETUS: CPT

## 2021-03-04 PROCEDURE — 99284 EMERGENCY DEPT VISIT MOD MDM: CPT

## 2021-03-04 RX ORDER — SODIUM CHLORIDE 9 MG/ML
1000 INJECTION, SOLUTION INTRAVENOUS ONCE
Status: COMPLETED | OUTPATIENT
Start: 2021-03-04 | End: 2021-03-04

## 2021-03-04 RX ADMIN — SODIUM CHLORIDE 1000 ML: 9 INJECTION, SOLUTION INTRAVENOUS at 21:30

## 2021-03-04 ASSESSMENT — LIFESTYLE VARIABLES
TOTAL SCORE: 0
HAVE PEOPLE ANNOYED YOU BY CRITICIZING YOUR DRINKING: NO
EVER HAD A DRINK FIRST THING IN THE MORNING TO STEADY YOUR NERVES TO GET RID OF A HANGOVER: NO
TOTAL SCORE: 0
EVER FELT BAD OR GUILTY ABOUT YOUR DRINKING: NO
TOTAL SCORE: 0
HAVE YOU EVER FELT YOU SHOULD CUT DOWN ON YOUR DRINKING: NO
DO YOU DRINK ALCOHOL: YES
CONSUMPTION TOTAL: INCOMPLETE

## 2021-03-05 NOTE — ED NOTES
Seen by ERP.  US completed.  Bolus started.  Orthostatics completed, HR 120s when standing. ERP updated.

## 2021-03-05 NOTE — ED TRIAGE NOTES
"Chief Complaint   Patient presents with   • Dizziness     pt reports days of feeling dizzy, headaches, numbness/tingling in fingers, abd pain. not currently having these symptoms. pt states she feels like she almost passed out the other day.   • Pregnancy     pt does not know how far along she is, has been unable to get appointment for check-up. last menstral period beginning of Jan. approx 8-9 weeks pregnant        Pt ambulatory to triage with steady gait for above complaint. Pt reports no vaginal bleeding.    Pt is alert and oriented, speaking in full sentences, follows commands and responds appropriately to questions. Resp are even and unlabored.     Pt placed in lobby. Pt educated on triage process and encouraged to alert staff for any changes.      BP (!) 92/60   Pulse 75   Temp 37 °C (98.6 °F) (Temporal)   Resp 14   Ht 1.575 m (5' 2\")   Wt 55.2 kg (121 lb 11.1 oz)   SpO2 98%     "

## 2021-03-05 NOTE — ED NOTES
Discharge education provided. Discharge paperwork signed and given to pt. All questions answered. All belongings with pt. Work note given to pt for 7 days per ERP. Pt ambulated to lobby unassisted.

## 2021-03-05 NOTE — ED PROVIDER NOTES
"ED Provider Note    CHIEF COMPLAINT  Chief Complaint   Patient presents with   • Dizziness     pt reports days of feeling dizzy, headaches, numbness/tingling in fingers, abd pain. not currently having these symptoms. pt states she feels like she almost passed out the other day.   • Pregnancy     pt does not know how far along she is, has been unable to get appointment for check-up. last menstral period beginning of Jan. approx 8-9 weeks pregnant        HPI  Jennifer Cueto is a 25 y.o. female here for evaluation of intermittent dizziness and pregnancy.  Patient states that a few days ago she felt very dizzy and lightheaded, and \"almost passed out.\"  However she did not, and has had no issues since that time.  She states that over the last day she has been having some mild lower abdominal cramping, and states that she is pregnant but unsure how many weeks she is along.  She has no vaginal bleeding or abnormal discharge.  She has no fever chills or vomiting.  She has no other medical concerns at this time.  Patient states that she does breathe quickly and have some numbness to her hands, but none today.  She has no current headache.      ROS  See HPI for further details, o/w negative.     PAST MEDICAL HISTORY   No bleeding disorders    SOCIAL HISTORY  Social History     Tobacco Use   • Smoking status: Never Smoker   • Smokeless tobacco: Never Used   Substance and Sexual Activity   • Alcohol use: Never   • Drug use: Never   • Sexual activity: Not Currently     Partners: Male     Comment: None        Family History  No bleeding disorders    SURGICAL HISTORY  patient denies any surgical history    CURRENT MEDICATIONS  Home Medications    **Home medications have not yet been reviewed for this encounter**         ALLERGIES  No Known Allergies    REVIEW OF SYSTEMS  See HPI for further details. Review of systems as above, otherwise all other systems are negative.     PHYSICAL EXAM  Constitutional: Well developed, well " nourished. No acute distress.  HEENT: Normocephalic, atraumatic. Posterior pharynx clear and moist.  Eyes:  EOMI. Normal sclera.  Neck: Supple, Full range of motion, nontender.  Chest/Pulmonary: clear to ausculation. Symmetrical expansion.   Cardio: Regular rate and rhythm with no murmur.   Abdomen: Soft, nontender. No peritoneal signs. No guarding. No palpable masses.  Musculoskeletal: No deformity, no edema, neurovascular intact.   Neuro: Clear speech, appropriate, cooperative, cranial nerves II-XII grossly intact.  Psych: Normal mood and affect    Results for orders placed or performed during the hospital encounter of 10/18/19   AMNISURE ROM ASSAY   Result Value Ref Range    AmniSure ROM Negative Negative   URINE DRUG SCREEN   Result Value Ref Range    Amphetamines Urine Negative Negative    Barbiturates Negative Negative    Benzodiazepines Negative Negative    Cocaine Metabolite Negative Negative    Methadone Negative Negative    Opiates Negative Negative    Oxycodone Negative Negative    Phencyclidine -Pcp Negative Negative    Propoxyphene Negative Negative    Cannabinoid Metab Negative Negative   Hold Blood Bank Specimen (Not Tested)   Result Value Ref Range    Holding Tube - Bb DONE    CBC WITH DIFFERENTIAL   Result Value Ref Range    WBC 11.8 (H) 4.8 - 10.8 K/uL    RBC 4.16 (L) 4.20 - 5.40 M/uL    Hemoglobin 10.5 (L) 12.0 - 16.0 g/dL    Hematocrit 34.0 (L) 37.0 - 47.0 %    MCV 81.7 81.4 - 97.8 fL    MCH 25.2 (L) 27.0 - 33.0 pg    MCHC 30.9 (L) 33.6 - 35.0 g/dL    RDW 41.2 35.9 - 50.0 fL    Platelet Count 232 164 - 446 K/uL    MPV 11.4 9.0 - 12.9 fL    Neutrophils-Polys 77.80 (H) 44.00 - 72.00 %    Lymphocytes 16.90 (L) 22.00 - 41.00 %    Monocytes 4.40 0.00 - 13.40 %    Eosinophils 0.30 0.00 - 6.90 %    Basophils 0.30 0.00 - 1.80 %    Immature Granulocytes 0.30 0.00 - 0.90 %    Nucleated RBC 0.00 /100 WBC    Neutrophils (Absolute) 9.20 (H) 2.00 - 7.15 K/uL    Lymphs (Absolute) 2.00 1.00 - 4.80 K/uL    Monos  (Absolute) 0.52 0.00 - 0.85 K/uL    Eos (Absolute) 0.03 0.00 - 0.51 K/uL    Baso (Absolute) 0.03 0.00 - 0.12 K/uL    Immature Granulocytes (abs) 0.03 0.00 - 0.11 K/uL    NRBC (Absolute) 0.00 K/uL   CBC without differential   Result Value Ref Range    WBC 10.9 (H) 4.8 - 10.8 K/uL    RBC 3.16 (L) 4.20 - 5.40 M/uL    Hemoglobin 8.2 (L) 12.0 - 16.0 g/dL    Hematocrit 26.2 (L) 37.0 - 47.0 %    MCV 82.9 81.4 - 97.8 fL    MCH 25.9 (L) 27.0 - 33.0 pg    MCHC 31.3 (L) 33.6 - 35.0 g/dL    RDW 41.7 35.9 - 50.0 fL    Platelet Count 193 164 - 446 K/uL    MPV 11.9 9.0 - 12.9 fL     US-OB LIMITED GROWTH FOLLOW UP Is the patient pregnant? Yes   Final Result         1.  Single intrauterine pregnancy of an estimated gestational age of 14 weeks 3 days with an estimated date of delivery of August 30, 2021.            PROCEDURES     MEDICAL RECORD  I have reviewed patient's medical record and pertinent results are listed.    COURSE & MEDICAL DECISION MAKING  I have reviewed any medical record information, laboratory studies and radiographic results as noted above.    HYDRATION: Based on the patient's presentation of Dehydration the patient was given IV fluids. IV Hydration was used because oral hydration was not adequate alone. Upon recheck following hydration, the patient was improved.    10:36 PM  Pt was orthostatic in regards to her heart rate, which went from 60s laying down, to 90s  When standing. She was given a liter of iv fluids, and feels good. No dizziness with standing.       If you have had any blood pressure issues while here in the emergency department, please see your doctor for a further evaluation or work up.    Differential diagnoses include but not limited to: pregnancy, threatened miscarriage, dehydration, uti.     This patient presents with pregnancy .  At this time, I have counseled the patient/family regarding their medications, pain control, and follow up.  They will continue their medications, if any, as  prescribed.  They will return immediately for any worsening symptoms and/or any other medical concerns.  They will see their doctor, or contact the doctor provided, in 1-2 days for follow up.       FINAL IMPRESSION  1. Pregnancy, unspecified gestational age             Electronically signed by: Gabriel Saravia D.O., 3/4/2021 9:18 PM

## 2021-05-13 ENCOUNTER — INITIAL PRENATAL (OUTPATIENT)
Dept: OBGYN | Facility: CLINIC | Age: 26
End: 2021-05-13
Payer: MEDICAID

## 2021-05-13 ENCOUNTER — APPOINTMENT (OUTPATIENT)
Dept: OBGYN | Facility: CLINIC | Age: 26
End: 2021-05-13
Payer: MEDICAID

## 2021-05-13 VITALS
BODY MASS INDEX: 24.94 KG/M2 | HEIGHT: 60 IN | SYSTOLIC BLOOD PRESSURE: 100 MMHG | DIASTOLIC BLOOD PRESSURE: 62 MMHG | WEIGHT: 127 LBS

## 2021-05-13 DIAGNOSIS — O23.42 URINARY TRACT INFECTION IN MOTHER DURING SECOND TRIMESTER OF PREGNANCY: ICD-10-CM

## 2021-05-13 DIAGNOSIS — Z34.82 ENCOUNTER FOR SUPERVISION OF OTHER NORMAL PREGNANCY, SECOND TRIMESTER: Primary | ICD-10-CM

## 2021-05-13 PROBLEM — Z34.81 ENCOUNTER FOR SUPERVISION OF NORMAL PREGNANCY IN MULTIGRAVIDA IN FIRST TRIMESTER: Status: RESOLVED | Noted: 2019-09-03 | Resolved: 2021-05-13

## 2021-05-13 LAB
APPEARANCE UR: NORMAL
BILIRUB UR STRIP-MCNC: NORMAL MG/DL
COLOR UR AUTO: NORMAL
GLUCOSE UR STRIP.AUTO-MCNC: NEGATIVE MG/DL
KETONES UR STRIP.AUTO-MCNC: NEGATIVE MG/DL
LEUKOCYTE ESTERASE UR QL STRIP.AUTO: NORMAL
NITRITE UR QL STRIP.AUTO: POSITIVE
PH UR STRIP.AUTO: 7 [PH] (ref 5–8)
PROT UR QL STRIP: NORMAL MG/DL
RBC UR QL AUTO: NORMAL
SP GR UR STRIP.AUTO: 1.02
UROBILINOGEN UR STRIP-MCNC: NORMAL MG/DL

## 2021-05-13 PROCEDURE — 0500F INITIAL PRENATAL CARE VISIT: CPT | Performed by: NURSE PRACTITIONER

## 2021-05-13 PROCEDURE — 81002 URINALYSIS NONAUTO W/O SCOPE: CPT | Performed by: NURSE PRACTITIONER

## 2021-05-13 ASSESSMENT — EDINBURGH POSTNATAL DEPRESSION SCALE (EPDS)
I HAVE BEEN ABLE TO LAUGH AND SEE THE FUNNY SIDE OF THINGS: AS MUCH AS I ALWAYS COULD
I HAVE FELT SAD OR MISERABLE: NO, NOT AT ALL
TOTAL SCORE: 3
I HAVE BEEN SO UNHAPPY THAT I HAVE BEEN CRYING: NO, NEVER
THE THOUGHT OF HARMING MYSELF HAS OCCURRED TO ME: NEVER
I HAVE BEEN SO UNHAPPY THAT I HAVE HAD DIFFICULTY SLEEPING: NOT AT ALL
THINGS HAVE BEEN GETTING ON TOP OF ME: NO, I HAVE BEEN COPING AS WELL AS EVER
I HAVE BEEN ANXIOUS OR WORRIED FOR NO GOOD REASON: NO, NOT AT ALL
I HAVE LOOKED FORWARD WITH ENJOYMENT TO THINGS: AS MUCH AS I EVER DID
I HAVE FELT SCARED OR PANICKY FOR NO GOOD REASON: NO, NOT MUCH
I HAVE BLAMED MYSELF UNNECESSARILY WHEN THINGS WENT WRONG: YES, SOME OF THE TIME

## 2021-05-13 ASSESSMENT — FIBROSIS 4 INDEX: FIB4 SCORE: 0.37

## 2021-05-13 NOTE — PATIENT INSTRUCTIONS
Plan:  Genetic screening was discussed with literature on Prenatal Screening, Cystic Fibrosis, and SMA provided and the patient declines at this time.  Discuss importance of water intake, healthy diet, eating 5 small meals throughout the day to maintain blood sugar and taking PNV  Discuss importance of exercise, as well as rest  Prenatal labs ordered.  Complete OB US next available.  Discuss policy for OB care at [facility]  Follow up in 4 weeks for next visit   Macrobid sent to pharmacy.  Work letter given.

## 2021-05-13 NOTE — LETTER
May 13, 2021              Jennifer Cueto is currently being cared for at [facility].  Her hours/activities need to be modified.  She should not lift over 20 pounds.          Thank you,          LEE Quinones.    Electronically Signed

## 2021-05-13 NOTE — PROGRESS NOTES
CC: New Ob visit     Subjective Ms. Jennifer Cueto  24w3d by 14w3d US presents for prenatal care. Today is her first prenatal visit at [facility] She denies any contractions/cramping, leakage of fluid, vaginal bleeding. She does feel movement. Pregnancy is unplanned but wanted.  Pt is not .  Lives with FOB and children.  Does work delivering food.  Still nursing her 2yo.      I have reviewed the patients' medical, surgical, gynecological, obstetrical, social, and family histories, medications and available lab results and pertinent notes are as follows:     OB History    Para Term  AB Living   5 4 4 0 0 4   SAB TAB Ectopic Molar Multiple Live Births   0 0 0 0 0 4       Past Gynecological History:  Denies fibroids, ovarian cysts, abnormal pap smears, STDs. She denies ever having surgery on her cervix, uterus, or ovaries in the past.  Last pap smear was unknown.    History reviewed. No pertinent past medical history.  History reviewed. No pertinent surgical history.   Social History     Socioeconomic History   • Marital status: Single     Spouse name: Not on file   • Number of children: Not on file   • Years of education: Not on file   • Highest education level: Not on file   Occupational History   • Not on file   Tobacco Use   • Smoking status: Never Smoker   • Smokeless tobacco: Never Used   Vaping Use   • Vaping Use: Never used   Substance and Sexual Activity   • Alcohol use: Never   • Drug use: Never   • Sexual activity: Yes     Partners: Male     Comment: None    Other Topics Concern   • Not on file   Social History Narrative   • Not on file     Social Determinants of Health     Financial Resource Strain:    • Difficulty of Paying Living Expenses:    Food Insecurity:    • Worried About Running Out of Food in the Last Year:    • Ran Out of Food in the Last Year:    Transportation Needs:    • Lack of Transportation (Medical):    • Lack of Transportation (Non-Medical):     Physical Activity:    • Days of Exercise per Week:    • Minutes of Exercise per Session:    Stress:    • Feeling of Stress :    Social Connections:    • Frequency of Communication with Friends and Family:    • Frequency of Social Gatherings with Friends and Family:    • Attends Zoroastrianism Services:    • Active Member of Clubs or Organizations:    • Attends Club or Organization Meetings:    • Marital Status:    Intimate Partner Violence:    • Fear of Current or Ex-Partner:    • Emotionally Abused:    • Physically Abused:    • Sexually Abused:      Family History:   Family History   Problem Relation Age of Onset   • Diabetes Paternal Grandmother    • Diabetes Paternal Grandfather        Genetic Screening/Teratology Counseling- Includes patient, baby's father, or anyone in either family with:  Patient's age 35 years or older as of estimated date of delivery: No   Thalassemia (Italian, Greek, Mediterranean, or  background): MCV less than 80: No   Neural tube defect (Meningomyelocele, Spina bifida, or Anencephaly): No   Congenital heart defect: No   Down syndrome: No   Jonny-Sachs (Ashkenazi Confucianism, Cajun, Thai Senegalese): No   Canavan disease (Ashkenazi Confucianism): No   Familial dysautonomia (Ashkenazi Confucianism): No   Sickle cell disease or trait (): No   Hemophilia or other blood disorders: No   Muscular dystrophy: No    Cystic fibrosis: No   Bardolph's chorea: No   Mental retardation/autism: No   Other inherited genetic or chromosomal disorder: No   Maternal metabolic disorder (eg. Type 1 diabetes, PKU): No   Patient or baby's father had child with birth defects not listed above: No   Recurrent pregnancy loss, or a stillbirth: No   Medications (including supplements, vitamins, herbs, or OTC drugs)/illicit/recreational drugs/alcohol since last menstrual period: No           Infection Prevention  1. High Risk For HIV: No 6. Rash Or Illness Since LMP: No   2. High Risk For Hepatitis B or C: No 7. History Of STD,  GC, Chlamydia, HPV Syphilis: No   3. Live With Someone With TB Or Exposed To TB: No 8. Have a cat in the home?: No   4. Patient Or Partner Has A History Of Herpes: No    5. History of Chicken Pox: No 9. Other (See Comments Below): No   Comments: Unplanned but desired pregnancy. FOB involved and supportive. Same FOB.no outside work         Allergies: Patient has no known allergies.  Objective:/62   Ht 1.524 m (5')   Wt 57.6 kg (127 lb)      Objective:   Vitals:    21 0859   BP: 100/62     Prenatal Physical:  General Exam:  HEENT comment:  Deferred  Heart: normal    Thyroid: normal    Lungs: normal    Lymph Nodes: normal    Breasts: normal    Neurological: normal    Abdomen: normal    Abdomen comment:  Gravid  Skin: normal    Extremities: normal    Pelvic Exam:  Vulva:  Normal  Vagina:  Normal  Vagina comment:   X 4 - proven to 3200g  Cervix:  Normal  Uterus (wks):  24  Adnexa:  Normal  Rectum:  Not assessed       Lab: No results found for this or any previous visit (from the past 672 hour(s)).    Ultrasound:  Reviewed initial scan and ERIN is by 14w3d US    Assessment:  --Normal Exam at 24w3d weeks, Size consistent with dates and FHR positive  Patient Active Problem List    Diagnosis Date Noted   • Encounter for supervision of other normal pregnancy, second trimester 2021   • Lactating mother 2021   • Urinary tract infection in mother during second trimester of pregnancy 2021         Plan:  Genetic screening was discussed with literature on Prenatal Screening, Cystic Fibrosis, and SMA provided and the patient declines at this time.  Discuss importance of water intake, healthy diet, eating 5 small meals throughout the day to maintain blood sugar and taking PNV  Discuss importance of exercise, as well as rest  Prenatal labs ordered.  Complete OB US next available.  Discuss policy for OB care at [facility]  Follow up in 4 weeks for next visit   Macrobid sent to  pharmacy.  Work letter given.  Rec'd additional calcium.    Chaperone offered and provided by Linda Dooley MA.

## 2021-05-13 NOTE — PROGRESS NOTES
NOB today. ER ultrasound done 3/4/21  Last pap: patient not sure  Phone # [phone]  Pharmacy confirmed  On PNV  Cystic Fibrosis test offered.

## 2021-05-18 ENCOUNTER — TELEPHONE (OUTPATIENT)
Dept: OBGYN | Facility: CLINIC | Age: 26
End: 2021-05-18

## 2021-05-18 DIAGNOSIS — O23.42 URINARY TRACT INFECTION IN MOTHER DURING SECOND TRIMESTER OF PREGNANCY: ICD-10-CM

## 2021-05-18 RX ORDER — NITROFURANTOIN 25; 75 MG/1; MG/1
100 CAPSULE ORAL 2 TIMES DAILY
Qty: 14 CAPSULE | Refills: 0 | Status: SHIPPED | OUTPATIENT
Start: 2021-05-18 | End: 2021-05-25

## 2021-05-19 ENCOUNTER — APPOINTMENT (OUTPATIENT)
Dept: RADIOLOGY | Facility: IMAGING CENTER | Age: 26
End: 2021-05-19
Attending: NURSE PRACTITIONER
Payer: MEDICAID

## 2021-05-19 DIAGNOSIS — Z34.82 ENCOUNTER FOR SUPERVISION OF OTHER NORMAL PREGNANCY, SECOND TRIMESTER: ICD-10-CM

## 2021-05-19 PROCEDURE — 76805 OB US >/= 14 WKS SNGL FETUS: CPT | Mod: TC | Performed by: NURSE PRACTITIONER

## 2021-05-27 ENCOUNTER — TELEPHONE (OUTPATIENT)
Dept: OBGYN | Facility: CLINIC | Age: 26
End: 2021-05-27

## 2021-05-27 DIAGNOSIS — O28.3 ABNORMAL PREGNANCY US: ICD-10-CM

## 2021-05-27 NOTE — TELEPHONE ENCOUNTER
----- Message from TANO Suarez sent at 5/27/2021  1:56 AM PDT -----  Pt to see MFM, referral placed.       Consulted with Dr. Ugalde and advise to inform pt that the fluid around estella's brain one side was measuring more than the other and a high resolution US needs to be done.     Called pt and notified as above. Explained to pt the referral was just placed today and it may take about a week for the perinatology office to call her and set up an appt. Explained to pt her referral most likely will be going to Dr. Johnson's office. Advised pt if she does not receive a call by next Friday 6/4/2021 to call Dr. Johnson's office to set up an appt, phone# provided. Advised pt to keep scheduled appts with us. Pt agreed and verbalized understanding.

## 2021-06-15 ENCOUNTER — ROUTINE PRENATAL (OUTPATIENT)
Dept: OBGYN | Facility: CLINIC | Age: 26
End: 2021-06-15
Payer: MEDICAID

## 2021-06-15 VITALS — BODY MASS INDEX: 25.58 KG/M2 | SYSTOLIC BLOOD PRESSURE: 90 MMHG | WEIGHT: 131 LBS | DIASTOLIC BLOOD PRESSURE: 46 MMHG

## 2021-06-15 DIAGNOSIS — Z3A.29 29 WEEKS GESTATION OF PREGNANCY: ICD-10-CM

## 2021-06-15 PROBLEM — Z34.83 ENCOUNTER FOR SUPERVISION OF OTHER NORMAL PREGNANCY, THIRD TRIMESTER: Status: ACTIVE | Noted: 2021-05-13

## 2021-06-15 PROCEDURE — 90471 IMMUNIZATION ADMIN: CPT | Performed by: NURSE PRACTITIONER

## 2021-06-15 PROCEDURE — 90040 PR PRENATAL FOLLOW UP: CPT | Performed by: NURSE PRACTITIONER

## 2021-06-15 PROCEDURE — 90715 TDAP VACCINE 7 YRS/> IM: CPT | Performed by: NURSE PRACTITIONER

## 2021-06-15 RX ORDER — NITROFURANTOIN 25; 75 MG/1; MG/1
100 CAPSULE ORAL 2 TIMES DAILY
Qty: 14 CAPSULE | Refills: 0 | Status: ON HOLD | OUTPATIENT
Start: 2021-06-15 | End: 2021-09-01

## 2021-06-15 ASSESSMENT — FIBROSIS 4 INDEX: FIB4 SCORE: 0.37

## 2021-06-15 NOTE — PROGRESS NOTES
SUBJECTIVE:  Pt is a 26 y.o.   at 29w1d  gestation. Presents today for follow-up prenatal care. Reports no issues at this time.  Reports good fetal movement. Denies regular cramping/contractions, bleeding or leaking of fluid. Denies dysuria, headaches, N/V. Generally feels well today except lower back tailbone pain. Reports she had problem getting macrobid and never took it. Denies any urinary/systemic symptoms.     OBJECTIVE:  - See prenatal vitals flow  -   Vitals:    06/15/21 1512   BP: (!) 90/46   Weight: 59.4 kg (131 lb)                 ASSESSMENT:   - IUP at 29w1d    - S=D   -   Patient Active Problem List    Diagnosis Date Noted   • Abnormal pregnancy US 2021   • Encounter for supervision of other normal pregnancy, third trimester 2021   • Lactating mother 2021   • Urinary tract infection in mother during second trimester of pregnancy 2021         PLAN:  - S/sx pregnancy and labor warning signs vs general discomforts discussed  - Fetal movements and/or kick counts reviewed   - Adequate hydration reinforced  - Nutrition/exercise/vitamin education; continue PNV  - Labs reprinted along with third tri and urine culture   - Macrobid sent in again   - Has Oki appt this week   - S/p Tdap vacc today   - Anticipatory guidance given  - RTC in 2 weeks for follow-up prenatal care

## 2021-06-15 NOTE — PROGRESS NOTES
Pt here today for OB follow up  Reports +FM  WT: 131 lb  BP: 90/46  Preferred pharmacy verified with pt.  CHELA sheet given and explained today  PNP, UDS, Hep C and 1 hr gtt labs not yet done. Pt states will get blood work done today. Lab slips reprinted for pt.   Declines BTL  Desires the Tdap vaccine  Good # [phone]    Tdap vaccine given today. Left Deltoid. VIS given and screening check list reviewed with pt.  Tdap vaccine verified by Alison FREEDMAN

## 2021-06-15 NOTE — LETTER
"Count Your Baby's Movements  Another step to a healthy delivery    Jennifer Cueto             Dept: [phone]    How Many Weeks Pregnant? 29W1D    Date to Begin Counting: Today 06/15/2021              How to use this chart    One way for your physician to keep track of your baby's health is by knowing how often the baby moves (or \"kicks\") in your womb.  You can help your physician to do this by using this chart every day.    Every day, you should see how many hours it takes for your baby to move 10 times.  Start in the morning, as soon as you get up.    · First, write down the time your baby moves until you get to 10.  · Check off one box every time your baby moves until you get to 10.  · Write down the time you finished counting in the last column.  · Total how long it took to count up all 10 movements.  · Finally, fill in the box that shows how long this took.  After counting 10 movements, you no longer have to count any more that day.  The next morning, just start counting again as soon as you get up.    What should you call a \"movement\"?  It is hard to say, because it will feel different from one mother to another and from one pregnancy to the next.  The important thing is that you count the movements the same way throughout your pregnancy.  If you have more questions, you should ask your physician.    Count carefully every day!  SAMPLE:  Week 28    How many hours did it take to feel 10 movements?       Start  Time     1     2     3     4     5     6     7     8     9     10   Finish Time   Mon 8:20 ·  ·  ·  ·  ·  ·  ·  ·  ·  ·  11:40   Tue Wed Thu Fri               Sat               Sun                 IMPORTANT: You should contact your physician if it takes more than two hours for you to feel 10 movements.  Each morning, write down the time and start to count the movements of your baby.  Keep track by checking off one box every time you feel one movement.  When you " "have felt 10 \"kicks\", write down the time you finished counting in the last column.  Then fill in the   box (over the check lillie) for the number of hours it took.  Be sure to read the complete instructions on the previous page.            "

## 2021-06-17 ENCOUNTER — TELEPHONE (OUTPATIENT)
Dept: OBGYN | Facility: CLINIC | Age: 26
End: 2021-06-17

## 2021-06-17 NOTE — TELEPHONE ENCOUNTER
Yvonne from Dr. Johnson's office called wanting to know if pt did first trimester screening, per Keely FREEDMAN's note states , Genetic screening was discussed with literature on Prenatal Screening, Cystic Fibrosis, and SMA provided and the patient declines at this time. Notified Moira from Dr. Johnson's office.

## 2021-08-09 ENCOUNTER — TELEPHONE (OUTPATIENT)
Dept: OBGYN | Facility: CLINIC | Age: 26
End: 2021-08-09

## 2021-08-09 ENCOUNTER — HOSPITAL ENCOUNTER (EMERGENCY)
Facility: MEDICAL CENTER | Age: 26
End: 2021-08-09
Attending: OBSTETRICS & GYNECOLOGY | Admitting: OBSTETRICS & GYNECOLOGY
Payer: MEDICAID

## 2021-08-09 VITALS
HEART RATE: 110 BPM | DIASTOLIC BLOOD PRESSURE: 58 MMHG | TEMPERATURE: 97.4 F | RESPIRATION RATE: 18 BRPM | SYSTOLIC BLOOD PRESSURE: 94 MMHG

## 2021-08-09 LAB
FLUAV RNA SPEC QL NAA+PROBE: NEGATIVE
FLUBV RNA SPEC QL NAA+PROBE: NEGATIVE
RSV RNA SPEC QL NAA+PROBE: NEGATIVE
SARS-COV-2 RNA RESP QL NAA+PROBE: DETECTED
SPECIMEN SOURCE: ABNORMAL

## 2021-08-09 PROCEDURE — C9803 HOPD COVID-19 SPEC COLLECT: HCPCS | Performed by: OBSTETRICS & GYNECOLOGY

## 2021-08-09 PROCEDURE — 302449 STATCHG TRIAGE ONLY (STATISTIC)

## 2021-08-09 PROCEDURE — 59025 FETAL NON-STRESS TEST: CPT

## 2021-08-09 PROCEDURE — 0241U HCHG SARS-COV-2 COVID-19 NFCT DS RESP RNA 4 TRGT MIC: CPT

## 2021-08-09 NOTE — PROGRESS NOTES
37.0 Presenting with CO Covid symptoms (sore throat, HA, SOB,cough, and chills).  He 1 year old son was diagnosised by the ER last Friday.       CoviD swabs obtained, SpaO2 tracing and oxygen level WDL.

## 2021-08-09 NOTE — TELEPHONE ENCOUNTER
Pt called and states if we do have available transportation for patients. I verified with Fabiana BEE, informed Pt that unfortunately we do not have any available transportation for our patients. Pt understood and has no other questions.

## 2021-08-09 NOTE — PROGRESS NOTES
1350 Report received from NEGRO Lezama RN and assumed care.    1355 Dr. Phillip called and given report, D/C order received.    1407 L&D and COVID-19 D/C instructions reviewed with pt who states understanding. Pt d/c to self.

## 2021-08-09 NOTE — TELEPHONE ENCOUNTER
Pt called and states that she has been having SOB, loss her voice, and loss taste/smell for 3 days. Advised Pt to go to L&D for immediate evaluation. Pt has no other further questions.

## 2021-08-12 ENCOUNTER — HOSPITAL ENCOUNTER (EMERGENCY)
Facility: MEDICAL CENTER | Age: 26
End: 2021-08-12
Attending: OBSTETRICS & GYNECOLOGY | Admitting: OBSTETRICS & GYNECOLOGY

## 2021-08-12 VITALS
HEART RATE: 103 BPM | BODY MASS INDEX: 27.09 KG/M2 | DIASTOLIC BLOOD PRESSURE: 59 MMHG | HEIGHT: 60 IN | OXYGEN SATURATION: 96 % | SYSTOLIC BLOOD PRESSURE: 95 MMHG | TEMPERATURE: 98.5 F | WEIGHT: 138 LBS | RESPIRATION RATE: 16 BRPM

## 2021-08-12 PROCEDURE — 302449 STATCHG TRIAGE ONLY (STATISTIC)

## 2021-08-12 PROCEDURE — 59025 FETAL NON-STRESS TEST: CPT

## 2021-08-12 ASSESSMENT — FIBROSIS 4 INDEX: FIB4 SCORE: 0.37

## 2021-08-12 NOTE — PROGRESS NOTES
, EDC 21 (37w3d)     0215 - Pt arrived to labor and delivery for SOB and decreased FM. Pt is COVID positive as of . Pt placed in room 234.  External monitors in place X2. Pt states that her baby usually is active at this time of night, but she has not felt any kicks since around 8 am. Denies LOF, VB, UCs.  Complaints of COVID symptoms (cough, chills, sore throat, dizziness, diarrhea.    VSS, continuous O2 monitoring in place. POC discussed with pt and family members, all questions answered.     0315 - Reactive NST per Huber QUEZADA.    0330 - Updates to Mila SOSA. Discharge orders placed.    0345 - L&D and COVID-19 D/C instructions reviewed with pt who states understanding. Pt d/c home with FOB.

## 2021-08-30 ENCOUNTER — HOSPITAL ENCOUNTER (INPATIENT)
Facility: MEDICAL CENTER | Age: 26
LOS: 2 days | End: 2021-09-01
Attending: OBSTETRICS & GYNECOLOGY | Admitting: OBSTETRICS & GYNECOLOGY
Payer: MEDICAID

## 2021-08-30 LAB
ABO GROUP BLD: NORMAL
AMPHET UR QL SCN: NEGATIVE
BARBITURATES UR QL SCN: NEGATIVE
BASOPHILS # BLD AUTO: 0.3 % (ref 0–1.8)
BASOPHILS # BLD AUTO: 0.4 % (ref 0–1.8)
BASOPHILS # BLD: 0.02 K/UL (ref 0–0.12)
BASOPHILS # BLD: 0.03 K/UL (ref 0–0.12)
BENZODIAZ UR QL SCN: NEGATIVE
BLD GP AB SCN SERPL QL: NORMAL
BZE UR QL SCN: NEGATIVE
C TRACH DNA SPEC QL NAA+PROBE: NEGATIVE
CANNABINOIDS UR QL SCN: NEGATIVE
EOSINOPHIL # BLD AUTO: 0.02 K/UL (ref 0–0.51)
EOSINOPHIL # BLD AUTO: 0.02 K/UL (ref 0–0.51)
EOSINOPHIL NFR BLD: 0.3 % (ref 0–6.9)
EOSINOPHIL NFR BLD: 0.3 % (ref 0–6.9)
ERYTHROCYTE [DISTWIDTH] IN BLOOD BY AUTOMATED COUNT: 43 FL (ref 35.9–50)
ERYTHROCYTE [DISTWIDTH] IN BLOOD BY AUTOMATED COUNT: 43.5 FL (ref 35.9–50)
ERYTHROCYTE [DISTWIDTH] IN BLOOD BY AUTOMATED COUNT: 44.6 FL (ref 35.9–50)
EST. AVERAGE GLUCOSE BLD GHB EST-MCNC: 100 MG/DL
HBA1C MFR BLD: 5.1 % (ref 4–5.6)
HBV SURFACE AG SER QL: ABNORMAL
HCT VFR BLD AUTO: 26.7 % (ref 37–47)
HCT VFR BLD AUTO: 30.8 % (ref 37–47)
HCT VFR BLD AUTO: 30.9 % (ref 37–47)
HCV AB SER QL: ABNORMAL
HGB BLD-MCNC: 8.3 G/DL (ref 12–16)
HGB BLD-MCNC: 9.7 G/DL (ref 12–16)
HGB BLD-MCNC: 9.8 G/DL (ref 12–16)
HIV 1+2 AB+HIV1 P24 AG SERPL QL IA: NORMAL
IMM GRANULOCYTES # BLD AUTO: 0.02 K/UL (ref 0–0.11)
IMM GRANULOCYTES # BLD AUTO: 0.02 K/UL (ref 0–0.11)
IMM GRANULOCYTES NFR BLD AUTO: 0.3 % (ref 0–0.9)
IMM GRANULOCYTES NFR BLD AUTO: 0.3 % (ref 0–0.9)
LYMPHOCYTES # BLD AUTO: 1.8 K/UL (ref 1–4.8)
LYMPHOCYTES # BLD AUTO: 1.85 K/UL (ref 1–4.8)
LYMPHOCYTES NFR BLD: 24.6 % (ref 22–41)
LYMPHOCYTES NFR BLD: 25.3 % (ref 22–41)
MCH RBC QN AUTO: 25.8 PG (ref 27–33)
MCH RBC QN AUTO: 26 PG (ref 27–33)
MCH RBC QN AUTO: 26.1 PG (ref 27–33)
MCHC RBC AUTO-ENTMCNC: 31.1 G/DL (ref 33.6–35)
MCHC RBC AUTO-ENTMCNC: 31.5 G/DL (ref 33.6–35)
MCHC RBC AUTO-ENTMCNC: 31.7 G/DL (ref 33.6–35)
MCV RBC AUTO: 81.9 FL (ref 81.4–97.8)
MCV RBC AUTO: 82.2 FL (ref 81.4–97.8)
MCV RBC AUTO: 83.7 FL (ref 81.4–97.8)
METHADONE UR QL SCN: NEGATIVE
MONOCYTES # BLD AUTO: 0.33 K/UL (ref 0–0.85)
MONOCYTES # BLD AUTO: 0.35 K/UL (ref 0–0.85)
MONOCYTES NFR BLD AUTO: 4.5 % (ref 0–13.4)
MONOCYTES NFR BLD AUTO: 4.8 % (ref 0–13.4)
N GONORRHOEA DNA SPEC QL NAA+PROBE: NEGATIVE
NEUTROPHILS # BLD AUTO: 5.08 K/UL (ref 2–7.15)
NEUTROPHILS # BLD AUTO: 5.11 K/UL (ref 2–7.15)
NEUTROPHILS NFR BLD: 69.3 % (ref 44–72)
NEUTROPHILS NFR BLD: 69.6 % (ref 44–72)
NRBC # BLD AUTO: 0 K/UL
NRBC # BLD AUTO: 0 K/UL
NRBC BLD-RTO: 0 /100 WBC
NRBC BLD-RTO: 0 /100 WBC
OPIATES UR QL SCN: NEGATIVE
OXYCODONE UR QL SCN: NEGATIVE
PCP UR QL SCN: NEGATIVE
PLATELET # BLD AUTO: 184 K/UL (ref 164–446)
PLATELET # BLD AUTO: 217 K/UL (ref 164–446)
PLATELET # BLD AUTO: 223 K/UL (ref 164–446)
PMV BLD AUTO: 11.8 FL (ref 9–12.9)
PMV BLD AUTO: 11.9 FL (ref 9–12.9)
PMV BLD AUTO: 12 FL (ref 9–12.9)
PROPOXYPH UR QL SCN: NEGATIVE
RBC # BLD AUTO: 3.19 M/UL (ref 4.2–5.4)
RBC # BLD AUTO: 3.76 M/UL (ref 4.2–5.4)
RBC # BLD AUTO: 3.76 M/UL (ref 4.2–5.4)
RH BLD: NORMAL
RUBV AB SER QL: 8.56 IU/ML
SARS-COV+SARS-COV-2 AG RESP QL IA.RAPID: NOTDETECTED
SPECIMEN SOURCE: NORMAL
SPECIMEN SOURCE: NORMAL
TREPONEMA PALLIDUM IGG+IGM AB [PRESENCE] IN SERUM OR PLASMA BY IMMUNOASSAY: ABNORMAL
WBC # BLD AUTO: 11.2 K/UL (ref 4.8–10.8)
WBC # BLD AUTO: 7.3 K/UL (ref 4.8–10.8)
WBC # BLD AUTO: 7.3 K/UL (ref 4.8–10.8)

## 2021-08-30 PROCEDURE — 86901 BLOOD TYPING SEROLOGIC RH(D): CPT

## 2021-08-30 PROCEDURE — 700111 HCHG RX REV CODE 636 W/ 250 OVERRIDE (IP): Performed by: NURSE PRACTITIONER

## 2021-08-30 PROCEDURE — 86850 RBC ANTIBODY SCREEN: CPT

## 2021-08-30 PROCEDURE — 87081 CULTURE SCREEN ONLY: CPT

## 2021-08-30 PROCEDURE — 87426 SARSCOV CORONAVIRUS AG IA: CPT

## 2021-08-30 PROCEDURE — 87340 HEPATITIS B SURFACE AG IA: CPT

## 2021-08-30 PROCEDURE — 87150 DNA/RNA AMPLIFIED PROBE: CPT

## 2021-08-30 PROCEDURE — A9270 NON-COVERED ITEM OR SERVICE: HCPCS | Performed by: NURSE PRACTITIONER

## 2021-08-30 PROCEDURE — 83036 HEMOGLOBIN GLYCOSYLATED A1C: CPT

## 2021-08-30 PROCEDURE — 700102 HCHG RX REV CODE 250 W/ 637 OVERRIDE(OP): Performed by: NURSE PRACTITIONER

## 2021-08-30 PROCEDURE — 86803 HEPATITIS C AB TEST: CPT

## 2021-08-30 PROCEDURE — 86780 TREPONEMA PALLIDUM: CPT

## 2021-08-30 PROCEDURE — 304965 HCHG RECOVERY SERVICES

## 2021-08-30 PROCEDURE — 85025 COMPLETE CBC W/AUTO DIFF WBC: CPT

## 2021-08-30 PROCEDURE — 80307 DRUG TEST PRSMV CHEM ANLYZR: CPT

## 2021-08-30 PROCEDURE — 700105 HCHG RX REV CODE 258: Performed by: NURSE PRACTITIONER

## 2021-08-30 PROCEDURE — 770002 HCHG ROOM/CARE - OB PRIVATE (112)

## 2021-08-30 PROCEDURE — 59409 OBSTETRICAL CARE: CPT

## 2021-08-30 PROCEDURE — 86900 BLOOD TYPING SEROLOGIC ABO: CPT

## 2021-08-30 PROCEDURE — 86762 RUBELLA ANTIBODY: CPT

## 2021-08-30 PROCEDURE — 87591 N.GONORRHOEAE DNA AMP PROB: CPT

## 2021-08-30 PROCEDURE — 87389 HIV-1 AG W/HIV-1&-2 AB AG IA: CPT

## 2021-08-30 PROCEDURE — 87086 URINE CULTURE/COLONY COUNT: CPT

## 2021-08-30 PROCEDURE — 10907ZC DRAINAGE OF AMNIOTIC FLUID, THERAPEUTIC FROM PRODUCTS OF CONCEPTION, VIA NATURAL OR ARTIFICIAL OPENING: ICD-10-PCS | Performed by: OBSTETRICS & GYNECOLOGY

## 2021-08-30 PROCEDURE — 87491 CHLMYD TRACH DNA AMP PROBE: CPT

## 2021-08-30 PROCEDURE — 36415 COLL VENOUS BLD VENIPUNCTURE: CPT

## 2021-08-30 PROCEDURE — 85027 COMPLETE CBC AUTOMATED: CPT

## 2021-08-30 PROCEDURE — 302449 STATCHG TRIAGE ONLY (STATISTIC)

## 2021-08-30 PROCEDURE — 59409 OBSTETRICAL CARE: CPT | Performed by: OBSTETRICS & GYNECOLOGY

## 2021-08-30 RX ORDER — MISOPROSTOL 200 UG/1
800 TABLET ORAL
Status: DISCONTINUED | OUTPATIENT
Start: 2021-08-30 | End: 2021-08-30 | Stop reason: HOSPADM

## 2021-08-30 RX ORDER — OXYTOCIN 10 [USP'U]/ML
10 INJECTION, SOLUTION INTRAMUSCULAR; INTRAVENOUS
Status: DISCONTINUED | OUTPATIENT
Start: 2021-08-30 | End: 2021-08-30 | Stop reason: HOSPADM

## 2021-08-30 RX ORDER — MISOPROSTOL 200 UG/1
600 TABLET ORAL
Status: DISCONTINUED | OUTPATIENT
Start: 2021-08-30 | End: 2021-09-01 | Stop reason: HOSPADM

## 2021-08-30 RX ORDER — ACETAMINOPHEN 325 MG/1
325 TABLET ORAL EVERY 4 HOURS PRN
Status: DISCONTINUED | OUTPATIENT
Start: 2021-08-30 | End: 2021-09-01 | Stop reason: HOSPADM

## 2021-08-30 RX ORDER — SODIUM CHLORIDE, SODIUM LACTATE, POTASSIUM CHLORIDE, CALCIUM CHLORIDE 600; 310; 30; 20 MG/100ML; MG/100ML; MG/100ML; MG/100ML
INJECTION, SOLUTION INTRAVENOUS CONTINUOUS
Status: ACTIVE | OUTPATIENT
Start: 2021-08-30 | End: 2021-08-30

## 2021-08-30 RX ORDER — ACETAMINOPHEN 325 MG/1
650 TABLET ORAL EVERY 4 HOURS PRN
Status: DISCONTINUED | OUTPATIENT
Start: 2021-08-30 | End: 2021-09-01 | Stop reason: HOSPADM

## 2021-08-30 RX ORDER — IBUPROFEN 800 MG/1
800 TABLET ORAL EVERY 6 HOURS PRN
Status: DISCONTINUED | OUTPATIENT
Start: 2021-08-30 | End: 2021-09-01 | Stop reason: HOSPADM

## 2021-08-30 RX ORDER — SODIUM CHLORIDE, SODIUM LACTATE, POTASSIUM CHLORIDE, CALCIUM CHLORIDE 600; 310; 30; 20 MG/100ML; MG/100ML; MG/100ML; MG/100ML
INJECTION, SOLUTION INTRAVENOUS PRN
Status: DISCONTINUED | OUTPATIENT
Start: 2021-08-30 | End: 2021-09-01 | Stop reason: HOSPADM

## 2021-08-30 RX ORDER — HYDROCODONE BITARTRATE AND ACETAMINOPHEN 5; 325 MG/1; MG/1
2 TABLET ORAL EVERY 4 HOURS PRN
Status: DISCONTINUED | OUTPATIENT
Start: 2021-08-30 | End: 2021-09-01 | Stop reason: HOSPADM

## 2021-08-30 RX ORDER — IBUPROFEN 800 MG/1
800 TABLET ORAL EVERY 8 HOURS PRN
Status: DISCONTINUED | OUTPATIENT
Start: 2021-08-30 | End: 2021-08-30

## 2021-08-30 RX ADMIN — IBUPROFEN 800 MG: 800 TABLET, FILM COATED ORAL at 11:10

## 2021-08-30 RX ADMIN — OXYTOCIN 125 ML/HR: 10 INJECTION, SOLUTION INTRAMUSCULAR; INTRAVENOUS at 11:18

## 2021-08-30 RX ADMIN — OXYTOCIN 2000 ML/HR: 10 INJECTION, SOLUTION INTRAMUSCULAR; INTRAVENOUS at 10:05

## 2021-08-30 RX ADMIN — HYDROCODONE BITARTRATE AND ACETAMINOPHEN 1 TABLET: 5; 325 TABLET ORAL at 11:10

## 2021-08-30 ASSESSMENT — LIFESTYLE VARIABLES
HAVE YOU EVER FELT YOU SHOULD CUT DOWN ON YOUR DRINKING: NO
TOTAL SCORE: 0
EVER FELT BAD OR GUILTY ABOUT YOUR DRINKING: NO
TOTAL SCORE: 0
HAVE PEOPLE ANNOYED YOU BY CRITICIZING YOUR DRINKING: NO
ALCOHOL_USE: NO
AVERAGE NUMBER OF DAYS PER WEEK YOU HAVE A DRINK CONTAINING ALCOHOL: 0
TOTAL SCORE: 0
DOES PATIENT WANT TO STOP DRINKING: NO
ON A TYPICAL DAY WHEN YOU DRINK ALCOHOL HOW MANY DRINKS DO YOU HAVE: 0
EVER HAD A DRINK FIRST THING IN THE MORNING TO STEADY YOUR NERVES TO GET RID OF A HANGOVER: NO
CONSUMPTION TOTAL: NEGATIVE
EVER_SMOKED: NEVER
HOW MANY TIMES IN THE PAST YEAR HAVE YOU HAD 5 OR MORE DRINKS IN A DAY: 0

## 2021-08-30 ASSESSMENT — COPD QUESTIONNAIRES
HAVE YOU SMOKED AT LEAST 100 CIGARETTES IN YOUR ENTIRE LIFE: NO/DON'T KNOW
DO YOU EVER COUGH UP ANY MUCUS OR PHLEGM?: NO/ONLY WITH OCCASIONAL COLDS OR INFECTIONS
COPD SCREENING SCORE: 0
DURING THE PAST 4 WEEKS HOW MUCH DID YOU FEEL SHORT OF BREATH: NONE/LITTLE OF THE TIME
IN THE PAST 12 MONTHS DO YOU DO LESS THAN YOU USED TO BECAUSE OF YOUR BREATHING PROBLEMS: DISAGREE/UNSURE

## 2021-08-30 ASSESSMENT — PAIN DESCRIPTION - PAIN TYPE
TYPE: ACUTE PAIN
TYPE: ACUTE PAIN

## 2021-08-30 ASSESSMENT — FIBROSIS 4 INDEX: FIB4 SCORE: 0.37

## 2021-08-30 NOTE — PROGRESS NOTES
26 y.o.  EDC 21 EGA 40.0 here to Room 215 with Aunt c/o UC's q 4 minutes since 0330. Pt reports positive fetal movement, denies vaginal bleeding or LOF.   EFM & [place] applied. Vss.     07 SVE /-2/BBOW    Pt tested positive for covid on , now symptom free.     Report to Dr. Phillip. Labs collected with IV start.     Pt expecting a baby boy, desires to feed with Similac. Pt educated on benefits of breastfeeding.    Circumcision requested.   815 Kejni Frazier and Midwife student April into assess pt. SVE by midwife 8 cm.

## 2021-08-30 NOTE — DISCHARGE PLANNING
Discharge Planning Assessment Post Partum    Reason for Referral: Limited prenatal care  Address: [address] [place] [address]  Phone: [phone]  Type of Living Situation: living with FOB and children  Mom Diagnosis: Pregnancy  Baby Diagnosis: Theresa-40 weeks  Primary Language: MOB speaks English    Name of Baby: MOB stated she is waiting for the FOB to arrive to name the baby (Baby Boy Nory, : 21)  Father of the Baby: Uvaldo Roberts   Involved in baby’s care? Yes  Contact Information: [phone]    Prenatal Care: Yes-limited care at [facility] on 21, 6/15/21, and 2 visits on L&D.  Discussed limited care with mother and she stated transportation was an issue.  Discussed MTM (Medicaid Transportation Assistance Program) and provided MOB with their phone number  Mom's PCP: None  PCP for new baby: Pediatrician list provided to mother    Support System: FOB and family  Coping/Bonding between mother & baby: Yes  Supplies for Infant: MOB states she does have some supplies for infant but does not have a car seat.  Provided MOB with information to the [employer] Car Seat program and explained that she will need a car seat for discharge    Mom's Insurance: Medicaid  Baby Covered on Insurance:Yes  Mother Employed/School: Not currently  Other children in the home/names & ages: four children; ages 6, 4, 3, and 22 months    Financial Hardship/Income: No   Mom's Mental status: alert and oriented  Services used prior to admit: Medicaid    CPS History: No  Psychiatric History: No  Domestic Violence History: No  Drug/ETOH History: No    Resources Provided: pediatrician list, children and family resource list, post partum support and counseling resources, and a list of [facility] clinics  Referrals Made: diaper bank referral provided     Clearance for Discharge: Infant is cleared to discharge home with parents

## 2021-08-30 NOTE — H&P
LABOR AND DELIVERY HISTORY AND PHYSICAL    PATIENT ID:  NAME:  Jennifer Cueto  MRN:               [id number]  YOB: 1995    CC:  contraction    HPI:  Jennifer Cueto is a 26 y.o. female  at 40w0d by a 14 week ultrasound performed on 21 not c/w LMP on Patient's last menstrual period was 2021.. Estimated Date of Delivery: 21  Patient presents complaining of uterine contractions, with no loss of fluid.  normal fetal movement.  no vaginal bleeding.  Pregnancy was complicated by limited PNC.     ROS: Patient denies any fever chills, nausea, vomiting, headache, chest pain, shortness of breath, or dysuria or unusual swelling of hands or feet.     Prenatal Care: Obtained at [facility], 1st visit 21 with 2 total visits.  Third trimester BPs were approximately 100s/60.     Prenatal Labs:   Ordered and pending      PAP: Neg                No results for input(s): WBC, RBC, HEMOGLOBIN, HEMATOCRIT, MCV, MCH, RDW, PLATELETCT, MPV, NEUTSPOLYS, LYMPHOCYTES, MONOCYTES, EOSINOPHILS, BASOPHILS, RBCMORPHOLO in the last 72 hours.  No results for input(s): SODIUM, POTASSIUM, CHLORIDE, CO2, GLUCOSE, BUN, CPKTOTAL in the last 72 hours.       IMAGING:   OB ultrasound:   2021 9:59 AM     HISTORY/REASON FOR EXAM:  Evaluate fetal anatomy.     TECHNIQUE/EXAM DESCRIPTION: OB complete ultrasound.     COMPARISON:  3/4/2021     FINDINGS:  Fetal Lie:  Vertex  LMP:  2021  Clinical ERIN by LMP:  2021     Placenta (Location):  Posterior  Placenta Previa: No  Placental ndGndrndanddndend:nd nd2nd Amniotic Fluid Volume:  ADA = 13.14 cm     Fetal Heart Rate:  139 bpm     Cervical Length:  3.11 cm     No maternal adnexal mass is identified.     Fetal Anatomy  (Seen or Not Seen)  Lateral Ventricles     Seen  Cisterna Magna        Seen  Cerebellum              Seen  CSP             Seen  Orbits             Seen  Face/Lips                Seen  Cord Insertion         Seen  Placental CI         Seen  4 Chamber Heart     Seen  LVOT                Seen  RVOT              Seen  3 Vessel View     Seen  Stomach       Seen  Kidneys                   Seen  Urinary Bladder      Seen  Spine                       Seen  3 Vessel Cord          Seen  Both Upper Extremities    Seen  Both Lower Extremities    Seen  Diaphragm             Seen  Movement       Seen     Fetal Biometry  BPD    6.26 cm, 25 weeks 3 days, (44.6%)  HC    23.34 cm, 25 weeks 3 days, (30.6%)  AC    20.89 cm, 25 weeks 3 days, (45.9%)  Femur Length    4.22 cm, 23 weeks 5 days, (5.0%)  Humerus Length    4.13 cm, 25 weeks 0 days, (30.1%)  Cerebellum Diameter   2.60 cm, 23 weeks 3 day, (9.9%)     EGA by this US:  24 weeks 5 days  ERIN by this US: 2021  ERIN by 1st US:  2021     Estimated Fetal Weight:  739 grams  EFW Percentile: 22.4%     Comments:  Asymmetric lateral ventricles, LEFT larger than RIGHT.  Mildly prominent cisterna magna.     IMPRESSION:     Single intrauterine pregnancy of an estimated gestational age of 24 weeks 5 days with an estimated date of delivery of 2021.     Femur length and cerebral artery diameter delayed relative to other biometric parameters.     Asymmetric lateral ventricles, nonspecific.       POB Hx:  OB History    Para Term  AB Living   5 4 4     4   SAB TAB Ectopic Molar Multiple Live Births           0 4      # Outcome Date GA Lbr Amadou/2nd Weight Sex Delivery Anes PTL Lv   5 Current            4 Term 10/18/19 38w3d / 00:04 3.16 kg (6 lb 15.5 oz) M Vag-Spont None N MARTINA   3 Term 18 40w0d  2.722 kg (6 lb) F Vag-Spont  N MARTINA   2 Term 04/15/17 40w0d  3.175 kg (7 lb) M Vag-Spont  N MARTINA   1 Term 08/08/15 40w0d  2.722 kg (6 lb) F Vag-Spont  N MARTINA       PMH/Problem List:    No past medical history on file.  Patient Active Problem List    Diagnosis Date Noted   • Abnormal pregnancy US 2021   • Encounter for supervision of other normal pregnancy, third trimester 2021   • Lactating mother 2021   • Urinary tract  infection in mother during second trimester of pregnancy 05/13/2021       Current Outpatient Medications:  No current facility-administered medications on file prior to encounter.     Current Outpatient Medications on File Prior to Encounter   Medication Sig Dispense Refill   • Prenatal MV-Min-Fe Fum-FA-DHA (PRENATAL 1 PO) Take  by mouth.     • nitrofurantoin (MACROBID) 100 MG Cap Take 1 capsule by mouth 2 times a day. 14 capsule 0       PSH:    No past surgical history on file.    Allergies:   No Known Allergies    SH:  Social History     Socioeconomic History   • Marital status: Single     Spouse name: Not on file   • Number of children: Not on file   • Years of education: Not on file   • Highest education level: Not on file   Occupational History   • Not on file   Tobacco Use   • Smoking status: Never Smoker   • Smokeless tobacco: Never Used   Vaping Use   • Vaping Use: Never used   Substance and Sexual Activity   • Alcohol use: Never   • Drug use: Never   • Sexual activity: Yes     Partners: Male     Comment: None    Other Topics Concern   • Not on file   Social History Narrative   • Not on file     Social Determinants of Health     Financial Resource Strain:    • Difficulty of Paying Living Expenses:    Food Insecurity:    • Worried About Running Out of Food in the Last Year:    • Ran Out of Food in the Last Year:    Transportation Needs:    • Lack of Transportation (Medical):    • Lack of Transportation (Non-Medical):    Physical Activity:    • Days of Exercise per Week:    • Minutes of Exercise per Session:    Stress:    • Feeling of Stress :    Social Connections:    • Frequency of Communication with Friends and Family:    • Frequency of Social Gatherings with Friends and Family:    • Attends Sabianist Services:    • Active Member of Clubs or Organizations:    • Attends Club or Organization Meetings:    • Marital Status:    Intimate Partner Violence:    • Fear of Current or Ex-Partner:    • Emotionally  Abused:    • Physically Abused:    • Sexually Abused:          PHYSICAL EXAM:  There were no vitals filed for this visit.  No data recorded.    General: No acute distress, resting comfortably in bed.  HEENT: normocephalic, nontraumatic, PERRLA, EOMI  Cardiovascular: Heart RRR with no murmurs, rubs or gallops. Distal Pulses 2+  Respiratory: symmetric chest expansion, lungs CTA bilaterally with no wheezes rales or rhonci  Abdomen: gravid, nontender  Musculoskeletal: strength 5/5 in four extremities  Neuro: non focal with no numbness, tingling or changes in sensation    SVE: 8cm  [place]: Q3 minutes;   EFM:  Baseline 125   moderate Variability   Accels to 150   Decels     A/P: Intrauterine pregancy at 40w0d weeks in active labor here for .      Patient Active Problem List    Diagnosis Date Noted   • Abnormal pregnancy US 2021   • Encounter for supervision of other normal pregnancy, third trimester 2021   • Lactating mother 2021   • Urinary tract infection in mother during second trimester of pregnancy 2021       1. IUP at term  2. Patient is GBS Unk  3. Prenatal labs ordered and pending  4. Anticipating     German Phillip M.D.

## 2021-08-30 NOTE — NON-PROVIDER
S: Pt is laying in bed, working through contractions. Family at bedside. Reports feeling some pressure during contractions.    O:    Vitals:    21 0730 21 0734   BP: 103/61    Pulse: 67    Resp: 18    Temp: 36.1 °C (97 °F)    SpO2: 93%    Weight:  59.4 kg (131 lb)   Height:  1.524 m (5')           FHTs:  Baseline 130, 15x15 accels, early decels, moderate variability        [place]: Contractions q 2-5 minutes, strong to palpation        SVE: 9cm/100%/-1         Education provided on AROM and verbal consent obtained from patient. AROM performed with thin meconium fluid noted.     A/P:    1.  IUP @ 40w0d   2.  Cat 1 FHTs    3.  GBS unknown: No indication for prophylaxis. Monitor VS    4.  Anticipate      April Harris, Student Nurse Midwife

## 2021-08-30 NOTE — NON-PROVIDER
Delivery Note for Vaginal Delivery     Stage 1:  26 y.o. y/o  at 40w0d weeks admitted for active labor. Her pregnancy has been complicated by inadequate prenatal care. Her labor progressed with AROM. The patient was noted to be GBS unnknown and no indication for prophylaxis.  Fetal monitoring during labor was overall category I with category II prior to delivery.  Patient remained unmedicated for pain control.     Stage II: At 0957, she was noted to be complete.  She then pushed for 5 minutes to deliver a  viable, vigorous male infant on 2021 at 1002.  The delivery was uncomplicated. Shoulders were delivered easily.  The infant was placed immediately upon mother’s abdomen. Apgars at 1 and 5 minutes were 8/8 and the infant has not yet been weighed.    Stage III: Attention was then turned to active management of the third stage. The placenta was delivered spontaneously with gentle manual traction on the umbilical cord with countertraction maintained suprapubically.  On inspection, the placenta was intact and had normal insertion.  Upon delivery of the placenta, good hemostasis was noted with fundal massage and a 40 unit bolus of pitocin in IV infusion was administered per protocol.     Laceration repair: The perineum was inspected following delivery. The patient did not have any lacerations.    Estimated blood loss for the above procedures was 100 ml.     Mother and infant in stable condition, and family pleased with birth.     April Harris, Student Nurse Midwife

## 2021-08-31 LAB — GP B STREP DNA SPEC QL NAA+PROBE: NEGATIVE

## 2021-08-31 PROCEDURE — 700102 HCHG RX REV CODE 250 W/ 637 OVERRIDE(OP): Performed by: OBSTETRICS & GYNECOLOGY

## 2021-08-31 PROCEDURE — A9270 NON-COVERED ITEM OR SERVICE: HCPCS | Performed by: OBSTETRICS & GYNECOLOGY

## 2021-08-31 PROCEDURE — 770002 HCHG ROOM/CARE - OB PRIVATE (112)

## 2021-08-31 RX ADMIN — IBUPROFEN 800 MG: 800 TABLET, FILM COATED ORAL at 20:50

## 2021-08-31 RX ADMIN — IBUPROFEN 800 MG: 800 TABLET, FILM COATED ORAL at 02:06

## 2021-08-31 ASSESSMENT — PAIN DESCRIPTION - PAIN TYPE
TYPE: ACUTE PAIN

## 2021-08-31 ASSESSMENT — EDINBURGH POSTNATAL DEPRESSION SCALE (EPDS)
I HAVE BEEN ANXIOUS OR WORRIED FOR NO GOOD REASON: NO, NOT AT ALL
THINGS HAVE BEEN GETTING ON TOP OF ME: NO, I HAVE BEEN COPING AS WELL AS EVER
I HAVE FELT SCARED OR PANICKY FOR NO GOOD REASON: NO, NOT AT ALL
I HAVE BLAMED MYSELF UNNECESSARILY WHEN THINGS WENT WRONG: NO, NEVER
I HAVE BEEN SO UNHAPPY THAT I HAVE BEEN CRYING: NO, NEVER
I HAVE BEEN ABLE TO LAUGH AND SEE THE FUNNY SIDE OF THINGS: AS MUCH AS I ALWAYS COULD
I HAVE FELT SAD OR MISERABLE: NO, NOT AT ALL
THE THOUGHT OF HARMING MYSELF HAS OCCURRED TO ME: NEVER
I HAVE LOOKED FORWARD WITH ENJOYMENT TO THINGS: AS MUCH AS I EVER DID
I HAVE BEEN SO UNHAPPY THAT I HAVE HAD DIFFICULTY SLEEPING: NOT AT ALL

## 2021-08-31 NOTE — PROGRESS NOTES
Obstetrics & Gynecology Post-Delivery Progress Note    Date of Service      26 y.o.  s/p vaginal delivery day #1  Delivery date: 2021      Subjective  Pt reports minimal pain with no concerns today. However, patient did state she was startled by a 3-4 inch long clot that she passed this morning. No other clots since then.     Pain: minimal   Bleeding: lochia   Tolerating PO: yes  Voiding: yes  Ambulating: yes   Passing flatus: no   Feeding: yes      Objective  24hr VS:  Temp:  [36.1 °C (97 °F)-36.8 °C (98.2 °F)] 36.6 °C (97.9 °F)  Pulse:  [59-72] 63  Resp:  [16-18] 18  BP: ()/(44-65) 99/65  SpO2:  [93 %-100 %] 100 %    Physical Exam  Gen: well appearing, in no acute distress   CV: S2/S2 present, no rubs, gallops or murmurs    Resp: CTAB  Abd: Fundus: at the level of the umbilicus, non tender to palpation   Ext: non edematous,  calves nontender    Labs:  Recent Labs     21  0750 21  2329   WBC 7.3  7.3 11.2*   RBC 3.76*  3.76* 3.19*   HEMOGLOBIN 9.7*  9.8* 8.3*   HEMATOCRIT 30.8*  30.9* 26.7*   MCV 81.9  82.2 83.7   MCH 25.8*  26.1* 26.0*   RDW 43.0  43.5 44.6   PLATELETCT 217  223 184   MPV 11.9  12.0 11.8   NEUTSPOLYS 69.60  69.30  --    LYMPHOCYTES 24.60  25.30  --    MONOCYTES 4.80  4.50  --    EOSINOPHILS 0.30  0.30  --    BASOPHILS 0.40  0.30  --        Medications     acetaminophen, acetaminophen, HYDROcodone-acetaminophen, LR, tetanus-dipth-acell pertussis, measles, mumps and rubella vaccine, oxytocin, misoprostol, ibuprofen      Assessment/Plan  Jennifer Cueto is a 26 y.o.yo  postpartum day #1 s/p vaginal delivery     - Post care: post delivery care and management   - Pain: minimal   - Rhpos  - Method of Feeding: breastfeeding   - Method of Contraception: would like to discuss to outpatient setting       - Disposition: likely home      Eugenia Hurt M.D.PhD

## 2021-08-31 NOTE — CARE PLAN
The patient is Stable - Low risk of patient condition declining or worsening    Shift Goals  Clinical Goals: pain control, lochia wnl  Patient Goals: Rest and sleep   Family Goals: Rest and sleep     Progress made toward(s) clinical / shift goals:  Rest    Patient is not progressing towards the following goals: N/A    Problem: Knowledge Deficit - Standard  Goal: Patient and family/care givers will demonstrate understanding of plan of care, disease process/condition, diagnostic tests and medications  Outcome: Progressing  Note: Pt updated this AM by pediatrician, no additional questions at this time. RN to keep pt informed in the moment of any changes in POC.      Problem: Pain - Standard  Goal: Alleviation of pain or a reduction in pain to the patient’s comfort goal  Outcome: Progressing  Note: Pt denies pain at this time, RN will continue to assess pt's pain levels throughout the shift.

## 2021-08-31 NOTE — PROGRESS NOTES
Received report, assumed pt care. Pt a&o 4, VSS, assessment completed. Resting comfortably in bed with call light, bedside table in reach. No c/o at this time. Side rails up 2. Instructed to use call light when needing assistsance verbalized understanding. Bed in low position. Will continue to monitor.

## 2021-08-31 NOTE — CARE PLAN
Problem: Pain - Standard  Goal: Alleviation of pain or a reduction in pain to the patient’s comfort goal  Outcome: Progressing     Problem: Altered Physiologic Condition  Goal: Patient physiologically stable as evidenced by normal lochia, palpable uterine involution and vitals within normal limits  Outcome: Progressing     Problem: Infection - Postpartum  Goal: Postpartum patient will be free of signs and symptoms of infection  Outcome: Progressing     The patient is Stable - Low risk of patient condition declining or worsening    Shift Goals  Clinical Goals: pain control, lochia wnl  Patient Goals: Rest and sleep   Family Goals: Rest and sleep     Progress made toward(s) clinical / shift goals:  Pain well controlled at this time, pt able to ambulate and care for self and infant without difficulty. No s/s infection noted, remains afebrile. Lochia light without clots, pt performing sridhar care independently and without difficulty.

## 2021-09-01 ENCOUNTER — PHARMACY VISIT (OUTPATIENT)
Dept: PHARMACY | Facility: MEDICAL CENTER | Age: 26
End: 2021-09-01
Payer: COMMERCIAL

## 2021-09-01 VITALS
BODY MASS INDEX: 25.72 KG/M2 | HEART RATE: 71 BPM | OXYGEN SATURATION: 98 % | WEIGHT: 131 LBS | RESPIRATION RATE: 18 BRPM | HEIGHT: 60 IN | SYSTOLIC BLOOD PRESSURE: 86 MMHG | TEMPERATURE: 97.4 F | DIASTOLIC BLOOD PRESSURE: 51 MMHG

## 2021-09-01 LAB
BACTERIA UR CULT: NORMAL
ERYTHROCYTE [DISTWIDTH] IN BLOOD BY AUTOMATED COUNT: 45.3 FL (ref 35.9–50)
HCT VFR BLD AUTO: 26.9 % (ref 37–47)
HGB BLD-MCNC: 8.4 G/DL (ref 12–16)
MCH RBC QN AUTO: 25.8 PG (ref 27–33)
MCHC RBC AUTO-ENTMCNC: 31.2 G/DL (ref 33.6–35)
MCV RBC AUTO: 82.5 FL (ref 81.4–97.8)
PLATELET # BLD AUTO: 202 K/UL (ref 164–446)
PMV BLD AUTO: 11.4 FL (ref 9–12.9)
RBC # BLD AUTO: 3.26 M/UL (ref 4.2–5.4)
SIGNIFICANT IND 70042: NORMAL
SITE SITE: NORMAL
SOURCE SOURCE: NORMAL
WBC # BLD AUTO: 9.8 K/UL (ref 4.8–10.8)

## 2021-09-01 PROCEDURE — 3E0134Z INTRODUCTION OF SERUM, TOXOID AND VACCINE INTO SUBCUTANEOUS TISSUE, PERCUTANEOUS APPROACH: ICD-10-PCS | Performed by: OBSTETRICS & GYNECOLOGY

## 2021-09-01 PROCEDURE — 85027 COMPLETE CBC AUTOMATED: CPT

## 2021-09-01 PROCEDURE — 700111 HCHG RX REV CODE 636 W/ 250 OVERRIDE (IP): Performed by: OBSTETRICS & GYNECOLOGY

## 2021-09-01 PROCEDURE — 700102 HCHG RX REV CODE 250 W/ 637 OVERRIDE(OP): Performed by: OBSTETRICS & GYNECOLOGY

## 2021-09-01 PROCEDURE — A9270 NON-COVERED ITEM OR SERVICE: HCPCS | Performed by: OBSTETRICS & GYNECOLOGY

## 2021-09-01 PROCEDURE — 90707 MMR VACCINE SC: CPT | Performed by: OBSTETRICS & GYNECOLOGY

## 2021-09-01 PROCEDURE — 36415 COLL VENOUS BLD VENIPUNCTURE: CPT

## 2021-09-01 PROCEDURE — RXMED WILLOW AMBULATORY MEDICATION CHARGE

## 2021-09-01 PROCEDURE — 90471 IMMUNIZATION ADMIN: CPT

## 2021-09-01 RX ORDER — ACETAMINOPHEN 325 MG/1
650 TABLET ORAL EVERY 4 HOURS PRN
Qty: 30 TABLET | Refills: 0 | Status: SHIPPED | OUTPATIENT
Start: 2021-09-01

## 2021-09-01 RX ORDER — IBUPROFEN 800 MG/1
800 TABLET ORAL EVERY 6 HOURS PRN
Qty: 30 TABLET | Refills: 0 | Status: SHIPPED | OUTPATIENT
Start: 2021-09-01

## 2021-09-01 RX ADMIN — MEASLES, MUMPS, AND RUBELLA VIRUS VACCINE LIVE 0.5 ML: 1000; 12500; 1000 INJECTION, POWDER, LYOPHILIZED, FOR SUSPENSION SUBCUTANEOUS at 09:58

## 2021-09-01 RX ADMIN — IBUPROFEN 800 MG: 800 TABLET, FILM COATED ORAL at 10:14

## 2021-09-01 ASSESSMENT — PAIN DESCRIPTION - PAIN TYPE
TYPE: ACUTE PAIN
TYPE: ACUTE PAIN

## 2021-09-01 NOTE — PROGRESS NOTES
Discharge instructions and follow up information provided to pt, she expresses understanding and all questions were answered. Pt will be discharged in stable condition with all personal belongings.

## 2021-09-01 NOTE — DISCHARGE PLANNING
Meds-to-Beds: Discharge prescription orders listed below delivered to patient's bedside. RN notified. Patient counseled. Patient elected to have co-payment billed to patient account.       Current Outpatient Medications   Medication Sig Dispense Refill   • ibuprofen (MOTRIN) 800 MG Tab Take 1 Tablet by mouth every 6 hours as needed. 30 Tablet 0   • acetaminophen (TYLENOL) 325 MG Tab Take 2 Tablets by mouth every four hours as needed. 30 Tablet 0      Lisa Pardo, PharmD

## 2021-09-01 NOTE — DISCHARGE SUMMARY
Discharge Summary:     Date of Admission: 2021  Date of Discharge: 21      Admitting diagnosis:    1. Pregnancy @ 40w0d      Discharge Diagnosis:   1. Status post vaginal delivery       No past medical history on file.  OB History    Para Term  AB Living   5 5 5     5   SAB TAB Ectopic Molar Multiple Live Births           0 5      # Outcome Date GA Lbr Amadou/2nd Weight Sex Delivery Anes PTL Lv   5 Term 21 40w0d  3.33 kg (7 lb 5.5 oz) M Vag-Spont None N MARTINA   4 Term 10/18/19 38w3d / 00:04 3.16 kg (6 lb 15.5 oz) M Vag-Spont None N MARTINA   3 Term 18 40w0d  2.722 kg (6 lb) F Vag-Spont  N MARTINA   2 Term 04/15/17 40w0d  3.175 kg (7 lb) M Vag-Spont  N MARTINA   1 Term 08/08/15 40w0d  2.722 kg (6 lb) F Vag-Spont  N MARTINA     No past surgical history on file.  Patient has no known allergies.    Patient Active Problem List   Diagnosis   • Encounter for supervision of other normal pregnancy, third trimester   • Lactating mother   • Urinary tract infection in mother during second trimester of pregnancy   • Abnormal pregnancy        Hospital Course:   26 y.o. now , was admitted for contractions, underwent vaginal delivery. Pt gave birth to baby boy with APGARs of 8/8 and weight 3.33 kg (7 lb 5.5 oz).  Patient's postpartum course was unremarkable, with progressive advancement in diet , ambulation and toleration of oral analgesia. Patient without complaints today and desires discharge.  For postpartum contraception, pt desires desires to discuss at outpatient setting.     Physical Exam:  Temp:  [36.3 °C (97.4 °F)-37.1 °C (98.8 °F)] 36.3 °C (97.4 °F)  Pulse:  [62-82] 71  Resp:  [18-20] 18  BP: (80-91)/(51-54) 86/51  SpO2:  [95 %-98 %] 98 %  Physical Exam  General: well appearing, in no acute distress  Chest/Breasts: no visible trauma   Abdomen:  Fundus: non tender at the level of the umbilicus   Extremities: nonedematous, calves nontender    Current Facility-Administered Medications   Medication  Dose   • oxytocin (PITOCIN) infusion (for postpartum)   mL/hr   • acetaminophen (Tylenol) tablet 325 mg  325 mg   • acetaminophen (Tylenol) tablet 650 mg  650 mg   • HYDROcodone-acetaminophen (NORCO) 5-325 MG per tablet 2 Tablet  2 Tablet   • LR infusion     • tetanus-dipth-acell pertussis (Tdap) inj 0.5 mL  0.5 mL   • measles, mumps and rubella vaccine (MMR) injection 0.5 mL  0.5 mL   • PRN oxytocin (PITOCIN) (20 Units/1000 mL) PRN for excessive uterine bleeding - See Admin Instr  125-999 mL/hr   • miSOPROStol (CYTOTEC) tablet 600 mcg  600 mcg   • ibuprofen (MOTRIN) tablet 800 mg  800 mg       Recent Labs     08/30/21  0750 08/30/21  2329 09/01/21  0648   WBC 7.3  7.3 11.2* 9.8   RBC 3.76*  3.76* 3.19* 3.26*   HEMOGLOBIN 9.7*  9.8* 8.3* 8.4*   HEMATOCRIT 30.8*  30.9* 26.7* 26.9*   MCV 81.9  82.2 83.7 82.5   MCH 25.8*  26.1* 26.0* 25.8*   MCHC 31.5*  31.7* 31.1* 31.2*   RDW 43.0  43.5 44.6 45.3   PLATELETCT 217  223 184 202   MPV 11.9  12.0 11.8 11.4         Activity/ Discharge Instructions::   Discharge to home  Pelvic Rest x 6 weeks  No heavy lifting x4 weeks  Call or come to ED for: heavy vaginal bleeding, fever >100.4, severe abdominal pain, severe headache, chest pain, shortness of breath,  N/V, incisional drainage, or other concerns.       Follow up:  [facility] in 5 weeks for vaginal delivery    Discharge Meds:   No current outpatient medications on file.       Eugenia Hurt M.D.PhD

## 2021-09-01 NOTE — DISCHARGE INSTRUCTIONS
PATIENT DISCHARGE EDUCATION INSTRUCTION SHEET  REASONS TO CALL YOUR OBSTETRICIAN  · Persistent fever, shaking, chills (Temperature higher than 100.4) may indicate you have an infection  · Heavy bleeding: soaking more than 1 pad per hour; Passing clots an egg-sized clot or bigger may mean you have an postpartum hemorrhage  · Foul odor from vagina or bad smelling or discolored discharge or blood  · Breast infection (Mastitis symptoms); breast pain, chills, fever, redness or red streaks, may feel flu like symptoms  · Urinary pain, burning or frequency  · Incision that is not healing, increased redness, swelling, tenderness or pain, or any pus from episiotomy or  site may mean you have an infection  · Redness, swelling, warmth, or painful to touch in the calf area of your leg may mean you have a blood clot  · Severe or intensified depression, thoughts or feelings of wanting to hurt yourself or someone else   · Pain in chest, obstructed breathing or shortness of breath (trouble catching your breath) may mean you are having a postpartum complication. Call your provider immediately   · Headache that does not get better, even after taking medicine, a bad headache with vision changes or pain in the upper right area of your belly may mean you have high blood pressure or post birth preeclampsia. Call your provider immediately    HAND WASHING  All family and friends should wash their hands:  · Before and after holding the baby  · Before feeding the baby  · After using the restroom or changing the baby's diaper    WOUND CARE  Ask your physician for additional care instructions. In general:  ·  Incision:  · May shower and pat incision dry   · Keep the incision clean and dry  · There should not be any opening or pus from the incision  · Continue to walk at home 3 times a day   · Do NOT lift anything heavier than your baby (over 10 pounds)  · Encourage family to help participate in care of the  to allow  rest and mom time to heal  · Episiotomy/Laceration  · May use sridhar-spray bottle, witch hazel pads and dermaplast spray for comfort  · Use sridhar-spray bottle after urinating to cleanse perineal area  · To prevent burning during urination spray sridhar-water bottle on labial area   · Pat perineal area dry until episiotomy/laceration is healed  · Continue to use sridhar-bottle until bleeding stops as needed  · If have a 2nd degree laceration or greater, a Sitz bath can offer relief from soreness, burning, and inflammation   · Sitz Bath   · Sit in 6 inches of warm water and soak laceration as needed until the laceration heals    VAGINAL CARE AND BLEEDING  · Nothing inside vagina for 6 weeks:   · No sexual intercourse, tampons or douching  · Bleeding may continue for 2-4 weeks. Amount and color may vary  · Soaking 1 pad or more in an hour for several hours is considered heavy bleeding  · Passing large egg sized blood clots can be concerning  · If you feel like you have heavy bleeding or are having increasing amount of blood clots call your Obstetrician immediately  · If you begin feeling faint upon standing, feeling sick to your stomach, have clammy skin, a really fast heartbeat, have chills, start feeling confused, dizzy, sleepy or weak, or feeling like you're going to faint call your Obstetrician immediately    HYPERTENSION   Preeclampsia or gestational hypertension are types of high blood pressure that only pregnant women can get. It is important for you to be aware of symptoms to seek early intervention and treatment. If you have any of these symptoms immediately call your Obstetrician    · Vision changes or blurred vision   · Severe headache or pain that is unrelieved with medication and will not go away  · Persistent pain in upper abdomen or shoulder   · Increased swelling of face, feet, or hands  · Difficulty breathing or shortness of breath at rest  · Urinating less than usual    URINATION AND BOWEL MOVEMENTS  · Eating  "more fiber (bran cereal, fruits, and vegetables) and drinking plenty of fluids will help to avoid constipation  · Urinary frequency and urgency after childbirth is normal  · If you experience any urinary pain, burning or frequency call your provider    BIRTH CONTROL  · It is possible to become pregnant at any time after delivery and while breastfeeding  · Plan to discuss a method of birth control with your physician at your post delivery follow up visit    POSTPARTUM BLUES  During the first few days after birth, you may experience a sense of the \"blues\" which may include impatience, irritability or even crying. These feelings come and go quickly. However, as many as 1 in 10 women experience emotional symptoms known as postpartum depression.     POSTPARTUM DEPRESSION    May start as early as the second or third day after delivery or take several weeks or months to develop. Symptoms of \"blues\" are present, but are more intense: Crying spells; loss of appetite; feelings of hopelessness or loss of control; fear of touching the baby; over concern or no concern at all about the baby; little or no concern about your own appearance/caring for yourself; and/or inability to sleep or excessive sleeping. Contact your Obstetrician if you are experiencing any of these symptoms     PREVENTING SHAKEN BABY  If you are angry or stressed, PUT THE BABY IN THE CRIB, step away, take some deep breaths, and wait until you are calm to care for the baby. DO NOT SHAKE THE BABY. You are not alone, call a supporter for help.  · [facility] 24/7 crisis call line (476-524-4094) or (1-526.510.7855)  · You can also text them, text \"ANSWER\" (888864)      "

## 2021-09-01 NOTE — CARE PLAN
Problem: Pain - Standard  Goal: Alleviation of pain or a reduction in pain to the patient’s comfort goal  Outcome: Progressing     Problem: Psychosocial - Postpartum  Goal: Patient will verbalize and demonstrate effective bonding and parenting behavior  Outcome: Progressing     Problem: Altered Physiologic Condition  Goal: Patient physiologically stable as evidenced by normal lochia, palpable uterine involution and vitals within normal limits  Outcome: Progressing     Problem: Infection - Postpartum  Goal: Postpartum patient will be free of signs and symptoms of infection  Outcome: Progressing     The patient is Stable - Low risk of patient condition declining or worsening    Shift Goals  Clinical Goals: lochai light, pain control.  Patient Goals: Rest  Family Goals: N/A    Progress made toward(s) clinical / shift goals:  Pain well controlled with ibuprofen at this time, good bonding observed, responsive to infant needs, asking appropriate questions, lochia remains light without clots at this time, no s/s infection noted, remains afebrile.

## 2022-01-27 NOTE — L&D DELIVERY NOTE
Vaginal Delivery Procedure Note:    Jennifer Cueto is a 26 y.o.     Weeks of gestation: 40  Diagnosis: Labor     of viable male infant over intact perineum. Vertex.. Anterior and posterior shoulders delivered with ease. Nose and mouth suctioned with bulb. Infant placed on maternal abdomen. Delayed cord clamp performed. Cord then clamped and cut by FOB.  APGARs 8 and 8  Birth weight pending  Placenta delivered intact with fundal massage and gentle cord traction. 3 Vessel cord.  Laceration: None  Excellent hemostasis.   mL  Sponge and needle counts correct.  Patient tolerated procedure well. Mother and baby bonding skin to skin.    26-year-old  5 para 4-0-0-4 presents at 40 weeks gestation in active labor.  Pregnancy complicated by inconsistent prenatal care.  GBS unknown.  Patient was admitted for labor management and underwent artificial rupture membranes.  Patient and quickly progressed to complete dilation and underwent delivery of a male infant Apgars of 8 and 8.  Delivery was performed by student nurse midwife.  I was present for the entire delivery and repair    Placenta spontaneously delivered.  Evaluation of the cervix, vagina perineum showed no lacerations.  Pitocin infusion was started to aid in uterine tone.  Minimal bleeding seen    Mother and baby doing well in delivery room

## 2022-06-03 PROCEDURE — 87040 BLOOD CULTURE FOR BACTERIA: CPT

## 2022-06-05 ENCOUNTER — APPOINTMENT (OUTPATIENT)
Dept: RADIOLOGY | Facility: MEDICAL CENTER | Age: 27
End: 2022-06-05
Attending: EMERGENCY MEDICINE

## 2022-06-05 ENCOUNTER — HOSPITAL ENCOUNTER (EMERGENCY)
Facility: MEDICAL CENTER | Age: 27
End: 2022-06-06
Attending: EMERGENCY MEDICINE

## 2022-06-05 VITALS
HEART RATE: 105 BPM | HEIGHT: 60 IN | DIASTOLIC BLOOD PRESSURE: 54 MMHG | OXYGEN SATURATION: 96 % | BODY MASS INDEX: 27.29 KG/M2 | TEMPERATURE: 97.3 F | SYSTOLIC BLOOD PRESSURE: 92 MMHG | RESPIRATION RATE: 19 BRPM | WEIGHT: 139 LBS

## 2022-06-05 DIAGNOSIS — J10.1 INFLUENZA A: ICD-10-CM

## 2022-06-05 DIAGNOSIS — E86.0 DEHYDRATION: ICD-10-CM

## 2022-06-05 LAB
ALBUMIN SERPL BCP-MCNC: 4.6 G/DL (ref 3.2–4.9)
ALBUMIN/GLOB SERPL: 1.4 G/DL
ALP SERPL-CCNC: 131 U/L (ref 30–99)
ALT SERPL-CCNC: 40 U/L (ref 2–50)
ANION GAP SERPL CALC-SCNC: 14 MMOL/L (ref 7–16)
APPEARANCE UR: CLEAR
AST SERPL-CCNC: 41 U/L (ref 12–45)
BACTERIA #/AREA URNS HPF: ABNORMAL /HPF
BASOPHILS # BLD AUTO: 0.2 % (ref 0–1.8)
BASOPHILS # BLD: 0.02 K/UL (ref 0–0.12)
BILIRUB SERPL-MCNC: 0.3 MG/DL (ref 0.1–1.5)
BILIRUB UR QL STRIP.AUTO: NEGATIVE
BUN SERPL-MCNC: 10 MG/DL (ref 8–22)
CALCIUM SERPL-MCNC: 9.3 MG/DL (ref 8.5–10.5)
CHLORIDE SERPL-SCNC: 101 MMOL/L (ref 96–112)
CO2 SERPL-SCNC: 20 MMOL/L (ref 20–33)
COLOR UR: YELLOW
CREAT SERPL-MCNC: 0.61 MG/DL (ref 0.5–1.4)
EKG IMPRESSION: NORMAL
EOSINOPHIL # BLD AUTO: 0.02 K/UL (ref 0–0.51)
EOSINOPHIL NFR BLD: 0.2 % (ref 0–6.9)
EPI CELLS #/AREA URNS HPF: ABNORMAL /HPF
ERYTHROCYTE [DISTWIDTH] IN BLOOD BY AUTOMATED COUNT: 36.9 FL (ref 35.9–50)
FLUAV RNA SPEC QL NAA+PROBE: POSITIVE
FLUBV RNA SPEC QL NAA+PROBE: NEGATIVE
GFR SERPLBLD CREATININE-BSD FMLA CKD-EPI: 125 ML/MIN/1.73 M 2
GLOBULIN SER CALC-MCNC: 3.4 G/DL (ref 1.9–3.5)
GLUCOSE SERPL-MCNC: 101 MG/DL (ref 65–99)
GLUCOSE UR STRIP.AUTO-MCNC: NEGATIVE MG/DL
HCG SERPL QL: NEGATIVE
HCT VFR BLD AUTO: 36.1 % (ref 37–47)
HGB BLD-MCNC: 12.2 G/DL (ref 12–16)
HYALINE CASTS #/AREA URNS LPF: ABNORMAL /LPF
IMM GRANULOCYTES # BLD AUTO: 0.03 K/UL (ref 0–0.11)
IMM GRANULOCYTES NFR BLD AUTO: 0.3 % (ref 0–0.9)
KETONES UR STRIP.AUTO-MCNC: ABNORMAL MG/DL
LACTATE BLD-SCNC: 1.5 MMOL/L (ref 0.5–2)
LEUKOCYTE ESTERASE UR QL STRIP.AUTO: NEGATIVE
LYMPHOCYTES # BLD AUTO: 0.53 K/UL (ref 1–4.8)
LYMPHOCYTES NFR BLD: 5.7 % (ref 22–41)
MCH RBC QN AUTO: 27.4 PG (ref 27–33)
MCHC RBC AUTO-ENTMCNC: 33.8 G/DL (ref 33.6–35)
MCV RBC AUTO: 81.1 FL (ref 81.4–97.8)
MICRO URNS: ABNORMAL
MONOCYTES # BLD AUTO: 0.88 K/UL (ref 0–0.85)
MONOCYTES NFR BLD AUTO: 9.5 % (ref 0–13.4)
NEUTROPHILS # BLD AUTO: 7.81 K/UL (ref 2–7.15)
NEUTROPHILS NFR BLD: 84.1 % (ref 44–72)
NITRITE UR QL STRIP.AUTO: NEGATIVE
NRBC # BLD AUTO: 0 K/UL
NRBC BLD-RTO: 0 /100 WBC
PH UR STRIP.AUTO: 6 [PH] (ref 5–8)
PLATELET # BLD AUTO: 291 K/UL (ref 164–446)
PMV BLD AUTO: 10.2 FL (ref 9–12.9)
POTASSIUM SERPL-SCNC: 3.2 MMOL/L (ref 3.6–5.5)
PROT SERPL-MCNC: 8 G/DL (ref 6–8.2)
PROT UR QL STRIP: NEGATIVE MG/DL
RBC # BLD AUTO: 4.45 M/UL (ref 4.2–5.4)
RBC # URNS HPF: ABNORMAL /HPF
RBC UR QL AUTO: ABNORMAL
RSV RNA SPEC QL NAA+PROBE: NEGATIVE
SARS-COV-2 RNA RESP QL NAA+PROBE: NOTDETECTED
SODIUM SERPL-SCNC: 135 MMOL/L (ref 135–145)
SP GR UR STRIP.AUTO: 1.01
SPECIMEN SOURCE: ABNORMAL
TROPONIN T SERPL-MCNC: <6 NG/L (ref 6–19)
UROBILINOGEN UR STRIP.AUTO-MCNC: 0.2 MG/DL
WBC # BLD AUTO: 9.3 K/UL (ref 4.8–10.8)
WBC #/AREA URNS HPF: ABNORMAL /HPF

## 2022-06-05 PROCEDURE — 93005 ELECTROCARDIOGRAM TRACING: CPT | Performed by: EMERGENCY MEDICINE

## 2022-06-05 PROCEDURE — 80053 COMPREHEN METABOLIC PANEL: CPT

## 2022-06-05 PROCEDURE — U0003 INFECTIOUS AGENT DETECTION BY NUCLEIC ACID (DNA OR RNA); SEVERE ACUTE RESPIRATORY SYNDROME CORONAVIRUS 2 (SARS-COV-2) (CORONAVIRUS DISEASE [COVID-19]), AMPLIFIED PROBE TECHNIQUE, MAKING USE OF HIGH THROUGHPUT TECHNOLOGIES AS DESCRIBED BY CMS-2020-01-R: HCPCS

## 2022-06-05 PROCEDURE — 96375 TX/PRO/DX INJ NEW DRUG ADDON: CPT

## 2022-06-05 PROCEDURE — 85025 COMPLETE CBC W/AUTO DIFF WBC: CPT

## 2022-06-05 PROCEDURE — 87086 URINE CULTURE/COLONY COUNT: CPT

## 2022-06-05 PROCEDURE — 84484 ASSAY OF TROPONIN QUANT: CPT

## 2022-06-05 PROCEDURE — 0241U HCHG SARS-COV-2 COVID-19 NFCT DS RESP RNA 4 TRGT MIC: CPT

## 2022-06-05 PROCEDURE — 700105 HCHG RX REV CODE 258: Performed by: EMERGENCY MEDICINE

## 2022-06-05 PROCEDURE — 83605 ASSAY OF LACTIC ACID: CPT

## 2022-06-05 PROCEDURE — 96374 THER/PROPH/DIAG INJ IV PUSH: CPT

## 2022-06-05 PROCEDURE — 71045 X-RAY EXAM CHEST 1 VIEW: CPT

## 2022-06-05 PROCEDURE — C9803 HOPD COVID-19 SPEC COLLECT: HCPCS | Performed by: EMERGENCY MEDICINE

## 2022-06-05 PROCEDURE — 36415 COLL VENOUS BLD VENIPUNCTURE: CPT

## 2022-06-05 PROCEDURE — 84703 CHORIONIC GONADOTROPIN ASSAY: CPT

## 2022-06-05 PROCEDURE — 700111 HCHG RX REV CODE 636 W/ 250 OVERRIDE (IP): Performed by: EMERGENCY MEDICINE

## 2022-06-05 PROCEDURE — U0005 INFEC AGEN DETEC AMPLI PROBE: HCPCS

## 2022-06-05 PROCEDURE — 81001 URINALYSIS AUTO W/SCOPE: CPT

## 2022-06-05 PROCEDURE — 87040 BLOOD CULTURE FOR BACTERIA: CPT | Mod: 91

## 2022-06-05 PROCEDURE — 93005 ELECTROCARDIOGRAM TRACING: CPT

## 2022-06-05 PROCEDURE — 99285 EMERGENCY DEPT VISIT HI MDM: CPT

## 2022-06-05 RX ORDER — SODIUM CHLORIDE, SODIUM LACTATE, POTASSIUM CHLORIDE, AND CALCIUM CHLORIDE .6; .31; .03; .02 G/100ML; G/100ML; G/100ML; G/100ML
30 INJECTION, SOLUTION INTRAVENOUS ONCE
Status: COMPLETED | OUTPATIENT
Start: 2022-06-05 | End: 2022-06-05

## 2022-06-05 RX ORDER — KETOROLAC TROMETHAMINE 30 MG/ML
30 INJECTION, SOLUTION INTRAMUSCULAR; INTRAVENOUS ONCE
Status: COMPLETED | OUTPATIENT
Start: 2022-06-05 | End: 2022-06-05

## 2022-06-05 RX ORDER — ONDANSETRON 2 MG/ML
4 INJECTION INTRAMUSCULAR; INTRAVENOUS ONCE
Status: COMPLETED | OUTPATIENT
Start: 2022-06-05 | End: 2022-06-05

## 2022-06-05 RX ADMIN — SODIUM CHLORIDE, POTASSIUM CHLORIDE, SODIUM LACTATE AND CALCIUM CHLORIDE 1893 ML: 600; 310; 30; 20 INJECTION, SOLUTION INTRAVENOUS at 22:00

## 2022-06-05 RX ADMIN — KETOROLAC TROMETHAMINE 30 MG: 30 INJECTION, SOLUTION INTRAMUSCULAR at 22:38

## 2022-06-05 RX ADMIN — ONDANSETRON HYDROCHLORIDE 4 MG: 2 SOLUTION INTRAMUSCULAR; INTRAVENOUS at 22:38

## 2022-06-05 ASSESSMENT — FIBROSIS 4 INDEX: FIB4 SCORE: 0.53

## 2022-06-06 LAB
SARS-COV-2 RNA RESP QL NAA+PROBE: NOTDETECTED
SPECIMEN SOURCE: NORMAL

## 2022-06-06 NOTE — ED TRIAGE NOTES
Chief Complaint   Patient presents with   • Shortness of Breath     Began last night, +coughing, flu sx, chest tightness worse with deep resp   • Fever     Home temp 102.5F   • Chills       Ambulated to triage for above complaint. Pt denies receiving Covid vaccines. +fatiuge/weakness    EKG obtained and verified by ERP. COVID protocol ordered. Pt educated of triage process and informed to contact staff if situation changes.      /59   Pulse (!) 139   Temp 37.3 °C (99.1 °F) (Temporal)   Resp 16   Ht 1.524 m (5')   Wt 63 kg (139 lb)   SpO2 96%   BMI 27.15 kg/m²

## 2022-06-06 NOTE — ED NOTES
Patient educated on discharge instructions, follow up appointments, prescriptions, and home care. Patient verbalized understanding. Patient ambulated well to [facility].

## 2022-06-06 NOTE — ED PROVIDER NOTES
ED Provider Note    Scribed for Gail Villasenor M.D. by Annita Zepeda. 6/5/2022  9:29 PM    Primary care provider: Pcp Pt States None  Means of arrival: Walk in   History obtained from: Patient  History limited by: None    CHIEF COMPLAINT  Chief Complaint   Patient presents with   • Shortness of Breath     Began last night, +coughing, flu sx, chest tightness worse with deep resp   • Fever     Home temp 102.5F   • Chills       HPI  Jennifer Cueto is a 27 y.o. female who presents to the Emergency Department for evaluation of shortness of breath onset last night. She admits to associated symptoms of cough, sore throat, lightheaded, loss of appetite, and body aches but denies diarrhea. No alleviating factors were reported. Patient reports that her children have the flu currently. She is not pregnant and has no history of diabetes or hypertension. She does not smoke, use drugs, or drink alcohol. Patient is not vaccinated for COVID or flu. She had COVID last year.       REVIEW OF SYSTEMS  HEENT:  Sore throat. No ear pain or congestion  CARDIAC: no chest pain, no palpitations    PULMONARY: shortness of breath, cough, no dyspnea or congestion   GI: no vomiting, diarrhea, or abdominal pain   : no dysuria, back pain, or hematuria   Neuro: Lightheaded, no weakness, numbness, aphasia, or headache. Loss of appetite.   Musculoskeletal: Body aches. no swelling, deformity, pain, or joint swelling  Endocrine: no fevers, sweating, or weight loss     See history of present illness. All other systems are negative. C.    PAST MEDICAL HISTORY  None.     SURGICAL HISTORY  patient denies any surgical history    SOCIAL HISTORY  Social History     Tobacco Use   • Smoking status: Never Smoker   • Smokeless tobacco: Never Used   Vaping Use   • Vaping Use: Never used   Substance Use Topics   • Alcohol use: Never   • Drug use: Never      Social History     Substance and Sexual Activity   Drug Use Never       FAMILY HISTORY  Family History    Problem Relation Age of Onset   • Diabetes Paternal Grandmother    • Diabetes Paternal Grandfather        CURRENT MEDICATIONS  Home Medications     Reviewed by Eda Ribeiro R.N. (Registered Nurse) on 06/05/22 at 2035  Med List Status: Partial   Medication Last Dose Status   acetaminophen (TYLENOL) 325 MG Tab  Active   ibuprofen (MOTRIN) 800 MG Tab  Active   Prenatal MV-Min-Fe Fum-FA-DHA (PRENATAL 1 PO)  Active                ALLERGIES  No Known Allergies    PHYSICAL EXAM  VITAL SIGNS: BP (!) 95/54   Pulse (!) 120   Temp 37.3 °C (99.1 °F) (Temporal)   Resp 15   Ht 1.524 m (5')   Wt 63 kg (139 lb)   SpO2 96%   BMI 27.15 kg/m²     Constitutional: Well developed, Well nourished, No acute distress, Ill appearance.   HEENT: Normocephalic, Atraumatic,  external ears normal, pharynx pink,  Mucous  Membranes dry, No rhinorrhea or mucosal edema  Eyes: PERRL, EOMI, Conjunctiva normal, No discharge.   Neck: Normal range of motion, No tenderness, Supple, No stridor.   Lymphatic: No lymphadenopathy    Cardiovascular: Tachycardic Rate and Regular Rhythm, No murmurs,  rubs, or gallops.   Thorax & Lungs: Lungs clear to auscultation bilaterally, No respiratory distress, No wheezes, rhales or rhonchi, No chest wall tenderness.   Abdomen: Bowel sounds normal, Soft, non tender, non distended,  No pulsatile masses., no rebound guarding or peritoneal signs.   Skin: Warm, Dry, No erythema, No rash,   Back:  No CVA tenderness,  No spinal tenderness, bony crepitance, step offs, or instability.   Neurologic: Alert & oriented clear speech no focal deficits  Extremities: Equal, intact distal pulses, No cyanosis, clubbing or edema,  No tenderness.   Musculoskeletal: Good range of motion in all major joints. No tenderness to palpation or major deformities noted.         DIAGNOSTIC STUDIES / PROCEDURES    LABS  Results for orders placed or performed during the hospital encounter of 06/05/22   SARS-CoV-2, PCR (24 Hour In-House)  Collect NP swab in [facility]    Specimen: Nasopharyngeal; Respirate   Result Value Ref Range    SARS-CoV-2 Source NP Swab    LACTIC ACID   Result Value Ref Range    Lactic Acid 1.5 0.5 - 2.0 mmol/L   CBC WITH DIFFERENTIAL   Result Value Ref Range    WBC 9.3 4.8 - 10.8 K/uL    RBC 4.45 4.20 - 5.40 M/uL    Hemoglobin 12.2 12.0 - 16.0 g/dL    Hematocrit 36.1 (L) 37.0 - 47.0 %    MCV 81.1 (L) 81.4 - 97.8 fL    MCH 27.4 27.0 - 33.0 pg    MCHC 33.8 33.6 - 35.0 g/dL    RDW 36.9 35.9 - 50.0 fL    Platelet Count 291 164 - 446 K/uL    MPV 10.2 9.0 - 12.9 fL    Neutrophils-Polys 84.10 (H) 44.00 - 72.00 %    Lymphocytes 5.70 (L) 22.00 - 41.00 %    Monocytes 9.50 0.00 - 13.40 %    Eosinophils 0.20 0.00 - 6.90 %    Basophils 0.20 0.00 - 1.80 %    Immature Granulocytes 0.30 0.00 - 0.90 %    Nucleated RBC 0.00 /100 WBC    Neutrophils (Absolute) 7.81 (H) 2.00 - 7.15 K/uL    Lymphs (Absolute) 0.53 (L) 1.00 - 4.80 K/uL    Monos (Absolute) 0.88 (H) 0.00 - 0.85 K/uL    Eos (Absolute) 0.02 0.00 - 0.51 K/uL    Baso (Absolute) 0.02 0.00 - 0.12 K/uL    Immature Granulocytes (abs) 0.03 0.00 - 0.11 K/uL    NRBC (Absolute) 0.00 K/uL   COMP METABOLIC PANEL   Result Value Ref Range    Sodium 135 135 - 145 mmol/L    Potassium 3.2 (L) 3.6 - 5.5 mmol/L    Chloride 101 96 - 112 mmol/L    Co2 20 20 - 33 mmol/L    Anion Gap 14.0 7.0 - 16.0    Glucose 101 (H) 65 - 99 mg/dL    Bun 10 8 - 22 mg/dL    Creatinine 0.61 0.50 - 1.40 mg/dL    Calcium 9.3 8.5 - 10.5 mg/dL    AST(SGOT) 41 12 - 45 U/L    ALT(SGPT) 40 2 - 50 U/L    Alkaline Phosphatase 131 (H) 30 - 99 U/L    Total Bilirubin 0.3 0.1 - 1.5 mg/dL    Albumin 4.6 3.2 - 4.9 g/dL    Total Protein 8.0 6.0 - 8.2 g/dL    Globulin 3.4 1.9 - 3.5 g/dL    A-G Ratio 1.4 g/dL   URINALYSIS    Specimen: Urine   Result Value Ref Range    Color Yellow     Character Clear     Specific Gravity 1.010 <1.035    Ph 6.0 5.0 - 8.0    Glucose Negative Negative mg/dL    Ketones Trace (A) Negative mg/dL    Protein Negative Negative  mg/dL    Bilirubin Negative Negative    Urobilinogen, Urine 0.2 Negative    Nitrite Negative Negative    Leukocyte Esterase Negative Negative    Occult Blood Moderate (A) Negative    Micro Urine Req Microscopic    HCG QUAL SERUM   Result Value Ref Range    Beta-Hcg Qualitative Serum Negative Negative   CoV-2, FLU A/B, and RSV by PCR (2-4 Hours CEPHEID) : Collect NP swab in VTM    Specimen: Respirate   Result Value Ref Range    Influenza virus A RNA POSITIVE (A) Negative    Influenza virus B, PCR Negative Negative    RSV, PCR Negative Negative    SARS-CoV-2 by PCR NotDetected     SARS-CoV-2 Source NP Swab    URINE MICROSCOPIC (W/UA)   Result Value Ref Range    WBC 2-5 /hpf    RBC 2-5 (A) /hpf    Bacteria Moderate (A) None /hpf    Epithelial Cells Many (A) /hpf    Hyaline Cast 0-2 /lpf   TROPONIN   Result Value Ref Range    Troponin T <6 6 - 19 ng/L   ESTIMATED GFR   Result Value Ref Range    GFR (CKD-EPI) 125 >60 mL/min/1.73 m 2   EKG (NOW)   Result Value Ref Range    Report       [facility] Emergency Dept.    Test Date:  2022  Pt Name:    ARLIN CLARK                Department: ER  MRN:        [id number]                      Room:  Gender:     Female                       Technician: [id number]  :        [id number]                   Requested By:ER TRIAGE PROTOCOL  Order #:    [id number]                    Reading MD: VERO VAN MD    Measurements  Intervals                                Axis  Rate:       126                          P:          47  NM:         137                          QRS:        42  QRSD:       81                           T:  QT:         291  QTc:        422    Interpretive Statements  Sinus tachycardia  Nonspecific T abnormalities, anterior leads  No previous ECG available for comparison  Electronically Signed On 2022 22:19:57 PDT by VERO VAN MD         EKG  12 lead EKG; Interpreted by emergency department physician as above.        RADIOLOGY  DX-CHEST-PORTABLE (1 VIEW)   Final Result      Negative single view of the chest.          COURSE & MEDICAL DECISION MAKING  Nursing notes, VS, PMSFHx reviewed in chart.    9:29 PM Patient seen and examined at bedside. Patient will be treated with Zofran injection 4 mg, Toradol injection 30 mg, and lactated ringers. Intravenous fluids administered for dehydration and tachycardia. Ordered COVID swab, EKG, HCG Qual, Blood culture, Urine culture, CMP, CBC with diff,and  Lactic acid to evaluate her symptoms. The differential diagnoses include but are not limited to: Sepsis vs Pneumonia vs Influenza vs Dehydration vs COVID.     9:56 PM - Ordered Urine microscopic and Troponin.     HYDRATION: Based on the patient's presentation of Dehydration and Tachycardia the patient was given IV fluids. IV Hydration was used because oral hydration was not adequate alone. Upon recheck following hydration, the patient was mildly improved.     11:51 PM upon recheck the when she stands up her blood pressure remains at 92 systolic but she does not feel dizzy or lightheaded.  She does feel improved after the fluids and Toradol.  Informed her that she has influenza and needs to drink plenty of fluids and rest at home and take Tylenol Motrin for fevers and body aches.  She should return for any worsening shortness of breath or worsening symptoms.     The patient will return for new or worsening symptoms and is stable at the time of discharge.    The patient is referred to a primary physician for blood pressure management, diabetic screening, and for all other preventative health concerns.    DISPOSITION:  Patient will be discharged home in stable condition.    FOLLOW UP:  [facility], Emergency Dept  [address] [address] [phone]    As needed, If symptoms worsen      OUTPATIENT MEDICATIONS:  New Prescriptions    No medications on file       FINAL IMPRESSION  1. Influenza A    2.  Dehydration          Annita GARCIA (Scribe), am scribing for, and in the presence of, Gail Villasenor M.D..    Electronically signed by: Annita Zepeda (Beti), 6/5/2022    Gail GARCIA M.D. personally performed the services described in this documentation, as scribed by Annita Zepeda in my presence, and it is both accurate and complete.    The note accurately reflects work and decisions made by me.  Gail Villasenor M.D.  6/5/2022  11:52 PM

## 2022-06-08 LAB
BACTERIA UR CULT: NORMAL
SIGNIFICANT IND 70042: NORMAL
SITE SITE: NORMAL
SOURCE SOURCE: NORMAL

## 2022-06-10 LAB
BACTERIA BLD CULT: NORMAL
SIGNIFICANT IND 70042: NORMAL
SITE SITE: NORMAL
SOURCE SOURCE: NORMAL

## 2022-06-11 LAB
BACTERIA BLD CULT: NORMAL
SIGNIFICANT IND 70042: NORMAL
SITE SITE: NORMAL
SOURCE SOURCE: NORMAL

## 2023-02-04 ENCOUNTER — HOSPITAL ENCOUNTER (EMERGENCY)
Facility: MEDICAL CENTER | Age: 28
End: 2023-02-04
Attending: EMERGENCY MEDICINE
Payer: COMMERCIAL

## 2023-02-04 ENCOUNTER — APPOINTMENT (OUTPATIENT)
Dept: RADIOLOGY | Facility: MEDICAL CENTER | Age: 28
End: 2023-02-04
Attending: EMERGENCY MEDICINE

## 2023-02-04 VITALS
TEMPERATURE: 97.2 F | DIASTOLIC BLOOD PRESSURE: 60 MMHG | WEIGHT: 133.38 LBS | BODY MASS INDEX: 25.18 KG/M2 | HEIGHT: 61 IN | SYSTOLIC BLOOD PRESSURE: 97 MMHG | RESPIRATION RATE: 15 BRPM | OXYGEN SATURATION: 97 % | HEART RATE: 68 BPM

## 2023-02-04 DIAGNOSIS — R07.9 CHEST PAIN, UNSPECIFIED TYPE: ICD-10-CM

## 2023-02-04 DIAGNOSIS — R06.00 DYSPNEA, UNSPECIFIED TYPE: ICD-10-CM

## 2023-02-04 LAB
ANION GAP SERPL CALC-SCNC: 10 MMOL/L (ref 7–16)
BASOPHILS # BLD AUTO: 0.2 % (ref 0–1.8)
BASOPHILS # BLD: 0.02 K/UL (ref 0–0.12)
BUN SERPL-MCNC: 18 MG/DL (ref 8–22)
CALCIUM SERPL-MCNC: 9.3 MG/DL (ref 8.5–10.5)
CHLORIDE SERPL-SCNC: 105 MMOL/L (ref 96–112)
CO2 SERPL-SCNC: 22 MMOL/L (ref 20–33)
CREAT SERPL-MCNC: 0.54 MG/DL (ref 0.5–1.4)
D DIMER PPP IA.FEU-MCNC: 0.3 UG/ML (FEU) (ref 0–0.5)
EKG IMPRESSION: NORMAL
EOSINOPHIL # BLD AUTO: 0.05 K/UL (ref 0–0.51)
EOSINOPHIL NFR BLD: 0.6 % (ref 0–6.9)
ERYTHROCYTE [DISTWIDTH] IN BLOOD BY AUTOMATED COUNT: 38 FL (ref 35.9–50)
GFR SERPLBLD CREATININE-BSD FMLA CKD-EPI: 129 ML/MIN/1.73 M 2
GLUCOSE SERPL-MCNC: 105 MG/DL (ref 65–99)
HCG SERPL QL: NEGATIVE
HCT VFR BLD AUTO: 39.2 % (ref 37–47)
HGB BLD-MCNC: 13.2 G/DL (ref 12–16)
IMM GRANULOCYTES # BLD AUTO: 0.03 K/UL (ref 0–0.11)
IMM GRANULOCYTES NFR BLD AUTO: 0.3 % (ref 0–0.9)
LYMPHOCYTES # BLD AUTO: 1.43 K/UL (ref 1–4.8)
LYMPHOCYTES NFR BLD: 16.4 % (ref 22–41)
MCH RBC QN AUTO: 28.5 PG (ref 27–33)
MCHC RBC AUTO-ENTMCNC: 33.7 G/DL (ref 33.6–35)
MCV RBC AUTO: 84.7 FL (ref 81.4–97.8)
MONOCYTES # BLD AUTO: 0.42 K/UL (ref 0–0.85)
MONOCYTES NFR BLD AUTO: 4.8 % (ref 0–13.4)
NEUTROPHILS # BLD AUTO: 6.79 K/UL (ref 2–7.15)
NEUTROPHILS NFR BLD: 77.7 % (ref 44–72)
NRBC # BLD AUTO: 0 K/UL
NRBC BLD-RTO: 0 /100 WBC
PLATELET # BLD AUTO: 310 K/UL (ref 164–446)
PMV BLD AUTO: 10.3 FL (ref 9–12.9)
POTASSIUM SERPL-SCNC: 3.7 MMOL/L (ref 3.6–5.5)
RBC # BLD AUTO: 4.63 M/UL (ref 4.2–5.4)
SODIUM SERPL-SCNC: 137 MMOL/L (ref 135–145)
WBC # BLD AUTO: 8.7 K/UL (ref 4.8–10.8)

## 2023-02-04 PROCEDURE — 36415 COLL VENOUS BLD VENIPUNCTURE: CPT

## 2023-02-04 PROCEDURE — 93005 ELECTROCARDIOGRAM TRACING: CPT | Performed by: EMERGENCY MEDICINE

## 2023-02-04 PROCEDURE — 93005 ELECTROCARDIOGRAM TRACING: CPT

## 2023-02-04 PROCEDURE — 80048 BASIC METABOLIC PNL TOTAL CA: CPT

## 2023-02-04 PROCEDURE — 85025 COMPLETE CBC W/AUTO DIFF WBC: CPT

## 2023-02-04 PROCEDURE — 85379 FIBRIN DEGRADATION QUANT: CPT

## 2023-02-04 PROCEDURE — 99283 EMERGENCY DEPT VISIT LOW MDM: CPT

## 2023-02-04 PROCEDURE — 84703 CHORIONIC GONADOTROPIN ASSAY: CPT

## 2023-02-04 PROCEDURE — 71045 X-RAY EXAM CHEST 1 VIEW: CPT

## 2023-02-04 ASSESSMENT — FIBROSIS 4 INDEX: FIB4 SCORE: 0.6

## 2023-02-05 NOTE — ED PROVIDER NOTES
"ER Provider Note    Scribed for Daniel Fajardo M.d. by Doni Mccarthy. 2/4/2023  9:05 PM    Primary Care Provider: Pcp Pt States None    CHIEF COMPLAINT  Chief Complaint   Patient presents with    Shortness of Breath     Started around 1700 tonight with palpitations.   First occurred two years ago, does not remember what the cause was at the time.     EXTERNAL RECORDS REVIEWED  Inpatient Notes Patient was admitted for child delivery in August of 2021.    HPI/ROS  LIMITATION TO HISTORY   None  OUTSIDE HISTORIAN(S):  None    Jennifer Cueto is a 27 y.o. female who presents to the ED complaining of shortness of breath onset 4 hours ago. She reports that she was trying to sleep but couldn't because of the shortness of breath and associated chest pain she reports as a \"heart ache.\" She denies any cough or fever and denies being on birth control, having any major stress in her life, or being currently pregnant. She endorses an episode of previous symptoms last year.    See HPI for details. All other systems negative.     PAST MEDICAL HISTORY  History reviewed. No pertinent past medical history.    SURGICAL HISTORY  History reviewed. No pertinent surgical history.    FAMILY HISTORY  Family History   Problem Relation Age of Onset    Diabetes Paternal Grandmother     Diabetes Paternal Grandfather      SOCIAL HISTORY   reports that she has never smoked. She has never used smokeless tobacco. She reports that she does not drink alcohol and does not use drugs.    CURRENT MEDICATIONS  Previous Medications    ACETAMINOPHEN (TYLENOL) 325 MG TAB    Take 2 Tablets by mouth every four hours as needed.    IBUPROFEN (MOTRIN) 800 MG TAB    Take 1 Tablet by mouth every 6 hours as needed.    PRENATAL MV-MIN-FE FUM-FA-DHA (PRENATAL 1 PO)    Take  by mouth.     ALLERGIES  Patient has no known allergies.    PHYSICAL EXAM  BP (!) 125/97   Pulse 81   Temp 37.2 °C (98.9 °F) (Temporal)   Resp 18   Ht 1.549 m (5' 1\")   Wt 60.5 kg (133 lb 6.1 oz)  "  SpO2 97%      Constitutional: Alert in no apparent distress.  HENT: No signs of trauma, Bilateral external ears normal, Nose normal.   Eyes: Pupils are equal and reactive, Conjunctiva normal, Non-icteric.   Neck: Normal range of motion, No tenderness, Supple, No stridor.   Lymphatic: No lymphadenopathy noted.   Cardiovascular: Regular rate and rhythm, no murmurs.   Thorax & Lungs: Normal breath sounds, No respiratory distress, No wheezing, No chest tenderness.   Abdomen: Bowel sounds normal, Soft, No tenderness, No masses, No pulsatile masses. No peritoneal signs.  Skin: Warm, Dry, No erythema, No rash.   Back: No bony tenderness, No CVA tenderness.   Extremities: Intact distal pulses, No edema, No tenderness, No cyanosis.  Musculoskeletal: Good range of motion in all major joints. No tenderness to palpation or major deformities noted.   Neurologic: Alert , Normal motor function, Normal sensory function, No focal deficits noted.   Psychiatric: Affect normal, Judgment normal, Mood normal.     DIAGNOSTIC STUDIES    Labs:   All labs reviewed by me.  Labs Reviewed   CBC WITH DIFFERENTIAL - Abnormal; Notable for the following components:       Result Value    Neutrophils-Polys 77.70 (*)     Lymphocytes 16.40 (*)     All other components within normal limits   BASIC METABOLIC PANEL - Abnormal; Notable for the following components:    Glucose 105 (*)     All other components within normal limits   HCG QUAL SERUM   D-DIMER   ESTIMATED GFR      EK Lead EKG interpreted by me to show:  Normal sinus rhythm  Rate 60  Axis: Normal  Intervals: Normal  Normal T waves  Normal ST segments  My impression of this EKG: No discordant T-waves. No ST elevation or depression. Does not indicate ischemia or arrhythmia at this time.     Radiology:   The attending emergency physician has independently interpreted the diagnostic imaging associated with this visit and am waiting the final reading from the radiologist.   Preliminary  interpretation is a follows: No acute disease noted.  Radiologist interpretation:   DX-CHEST-PORTABLE (1 VIEW)   Final Result      No acute cardiopulmonary disease evident.          COURSE & MEDICAL DECISION MAKING     ED Observation Status? Yes; I am placing the patient in to an observation status due to a diagnostic uncertainty as well as therapeutic intensity. Patient placed in observation status at 9:17 PM, 2/4/2023.     Observation plan is as follows: Labs and imaging to evaluate    Upon Reevaluation, the patient's condition has: Improved; and will be discharged.    Patient discharged from ED Observation status at 10:38 PM 2/4/2023.      INITIAL ASSESSMENT AND PLAN    9:05 PM - Patient was seen and evaluated at bedside. Ordered labs: EKG, Basic Metabolic Panel, CBC with differentials, D-Dimer, and Beta-HCG Qualitative and imaging: DX-Chest to evaluate their symptoms. Patient verbalizes understanding and agreement to this plan of care.      Care Narrative: Patient comes in with shortness of breath.  We will evaluate for PE as well as pneumonia and pneumothorax.  We will also evaluate for CT as well as anemia.    ADDITIONAL PROBLEM LIST AND DISPOSITION    Was found to have a negative D-dimer.  The patient has nonischemic EKG as well as a normal chest x-ray.  At this time there is no clear cause counseled the patient on this and gave her strict return precautions and follow-up.    Escalation of care considered, and ultimately not performed: the patient was evaluated by me, after discussion I have recommended the patient to be discharged.    Barriers to care at this time, including but not limited to:  None .     Decision tools and prescription drugs considered including, but not limited to: D-dimer neg .    Patient will be discharged home.    FOLLOW UP:  [facility]  [address] [place] [facility] [address]  [phone]      FINAL DIAGNOSIS  1. Dyspnea, unspecified type    2. Chest pain,  unspecified type      The note accurately reflects work and decisions made by me.  Daniel Fajardo M.D.  2/5/2023  3:27 AM     IDoni (Beti), am scribing for, and in the presence of, Daniel Fajardo M.D..    Electronically signed by: Doni Mccarthy (Beti), 2/4/2023    Daniel GARCIA M.D. personally performed the services described in this documentation, as scribed by Doni Mccarthy in my presence, and it is both accurate and complete.

## 2023-02-05 NOTE — ED NOTES
Pt to ORT 1 via steady ambulation. Pt is A&Ox4 and awake in bed. Pt placed on cardiac monitor, pulse ox, and automatic BP. VSS, saturating above 95% on RA, SR in the 70s. Call light within reach and chart up for ERP.

## 2023-02-05 NOTE — ED TRIAGE NOTES
"Chief Complaint   Patient presents with    Shortness of Breath     Started around 1700 tonight with palpitations.   First occurred two years ago, does not remember what the cause was at the time.       Pt ambulated to triage. Pt A&Ox4, came in for above complaint.  States she does not know if she is pregnant, and requesting a pregnancy test.  LMP 1/10/2023.     Pt to lobby . Pt educated on alerting staff in changes to condition. Pt verbalized understanding. Protocol ordered for EKG, completed prior to triage.     BP (!) 125/97   Pulse 81   Temp 37.2 °C (98.9 °F) (Temporal)   Resp 18   Ht 1.549 m (5' 1\")   Wt 60.5 kg (133 lb 6.1 oz)   LMP 01/10/2023 (Approximate)   SpO2 97%   Breastfeeding Yes   BMI 25.20 kg/m²     "

## 2023-02-14 ENCOUNTER — OCCUPATIONAL MEDICINE (OUTPATIENT)
Dept: URGENT CARE | Facility: PHYSICIAN GROUP | Age: 28
End: 2023-02-14
Payer: COMMERCIAL

## 2023-02-14 ENCOUNTER — APPOINTMENT (OUTPATIENT)
Dept: RADIOLOGY | Facility: IMAGING CENTER | Age: 28
End: 2023-02-14
Attending: NURSE PRACTITIONER
Payer: COMMERCIAL

## 2023-02-14 VITALS
HEIGHT: 61 IN | SYSTOLIC BLOOD PRESSURE: 114 MMHG | HEART RATE: 80 BPM | WEIGHT: 136 LBS | DIASTOLIC BLOOD PRESSURE: 60 MMHG | BODY MASS INDEX: 25.68 KG/M2 | OXYGEN SATURATION: 97 % | RESPIRATION RATE: 14 BRPM | TEMPERATURE: 98.3 F

## 2023-02-14 DIAGNOSIS — S89.91XA KNEE INJURY, RIGHT, INITIAL ENCOUNTER: ICD-10-CM

## 2023-02-14 DIAGNOSIS — S86.911A STRAIN OF RIGHT KNEE, INITIAL ENCOUNTER: ICD-10-CM
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
## 2023-02-14 PROCEDURE — 99203 OFFICE O/P NEW LOW 30 MIN: CPT | Mod: 29 | Performed by: NURSE PRACTITIONER

## 2023-02-14 PROCEDURE — 73564 X-RAY EXAM KNEE 4 OR MORE: CPT | Mod: TC,RT | Performed by: STUDENT IN AN ORGANIZED HEALTH CARE EDUCATION/TRAINING PROGRAM

## 2023-02-14 ASSESSMENT — ENCOUNTER SYMPTOMS
SENSORY CHANGE: 0
TINGLING: 0
BRUISES/BLEEDS EASILY: 0
FALLS: 0
WEAKNESS: 0
MYALGIAS: 1

## 2023-02-14 ASSESSMENT — FIBROSIS 4 INDEX: FIB4 SCORE: 0.56

## 2023-02-14 NOTE — LETTER
[facility]  [address] [place] [address]. [address] - [place] [address]  Phone:  [phone] - Fax:  [phone]   [facility] Progress Report and Disability Certification  Date of Service: 2/14/2023   No Show:  No  Date / Time of Next Visit: 2/18/2023@9:00AM   Claim Information   Patient Name: Jennifer Cueto  Claim Number:     Employer: [employer]  Date of Injury: 2/13/2023     Insurer / TPA: [employer]  ID / SSN:     Occupation: Stocking  Diagnosis: Diagnoses of Work related injury, Knee injury, right, initial encounter, and Strain of right knee, initial encounter were pertinent to this visit.    Medical Information   Related to Industrial Injury? Yes    Subjective Complaints:  DOI 2/13/2023. Pulling pallet down ramp from truck unloading and ran into pallet behind her. States jhony ran over her right foot and hit her in the right knee. Hit her lower back against the pallet at end of ramp. States pain level today 5/10. Has not taken over the counter Ibuprofen or Tylenol. No cool compress.  States pain and swelling on medial aspect of right knee joint.  Denies erythema or bruising no noted weakness/numbness/tingling in right lower extremity but does have some pain with weightbearing.  No previous injury to right lower extremity.  No secondary job noted.   Objective Findings: A/O x 3. Skin p/w/d, skin sensation intact. Vitals WNL.  No erythema, bruising or deformity at right knee joint.  Tenderness on palpation of medial aspect as well as inferior aspect of knee joint.  Antalgic gait.  No laxity in knee joint.  Medical assistant has applied knee brace to right knee.   Pre-Existing Condition(s):     Assessment:   Initial Visit    Status: Additional Care Required  Permanent Disability:No    Plan:      Diagnostics: X-ray    Comments:  Knee xray: No radiographic evidence of acute traumatic injury.    Disability Information   Status: Released to  Restricted Duty    From:  2023  Through: 2023 Restrictions are: Temporary   Physical Restrictions   Sitting:    Standing:  < or = to 4 hrs/day Stooping:    Bending:      Squatting:    Walking:  < or = to 4 hrs/day Climbin hrs/day Pushing:      Pulling:    Other:    Reaching Above Shoulder (L):   Reaching Above Shoulder (R):       Reaching Below Shoulder (L):    Reaching Below Shoulder (R):      Not to exceed Weight Limits   Carrying(hrs):   Weight Limit(lb): < or = to 25 pounds Lifting(hrs):   Weight  Limit(lb): < or = to 25 pounds   Comments: -Must wear knee brace while at work at all times  -Do note pull/push objects > 25 pounds  -May use over-the-counter ibuprofen or Tylenol as needed for pain  -May elevate leg as needed for any swelling and may apply cool compress  -May use over-the-counter topical pain reliever like IcyHot with lidocaine or Salonpas as needed  -Monitor for any increased weakness with ambulation or weightbearing, swelling, bruising or deformity of knee joint-must return to urgent care for evaluation  -Recheck on Saturday, 2023      Repetitive Actions   Hands: i.e. Fine Manipulations from Grasping:     Feet: i.e. Operating Foot Controls:     Driving / Operate Machinery:     Health Care Provider’s Original or Electronic Signature  TANO Butcher Health Care Provider’s Original or Electronic Signature    Neil De Los Santos DO MPH     Clinic Name / Location: [address]  [place] [address] Clinic Phone Number: Dept: [phone]   Appointment Time: 10:25 Am Visit Start Time: 10:52 AM   Check-In Time:  10:32 Am Visit Discharge Time:  12:35PM   Original-Treating Physician or Chiropractor    Page 2-Insurer/TPA    Page 3-Employer    Page 4-Employee

## 2023-02-14 NOTE — LETTER
"EMPLOYEE’S CLAIM FOR COMPENSATION/ REPORT OF INITIAL TREATMENT  FORM C-4    EMPLOYEE’S CLAIM - PROVIDE ALL INFORMATION REQUESTED   First Name  Jennifer Last Name  Nory Birthdate                    1995                Sex  female Claim Number (Insurer’s Use Only)    Home Address  [address] Age  27 y.o. 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 (if applicable)  Pulling pallets/unloading pallets    How did this injury or occupational disease occur? (Be specific an answer in detail. 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  As I was pulling the pallet, it was going down so fast and as I reach the outside from the truck, I never know there was another pallet behind me, so I hit my back so hard and the jhony ran over my leg.   If you believe that you have an occupational disease, when did you first have knowledge of the disability and it relationship to your employment?  N/A Witnesses to the Accident  My team      Nature " of Injury or Occupational Disease  Crushing  Part(s) of Body Injured or Affected  Knee (R), N/A, N/A    I certify that the above is true and correct to the best of my knowledge and that I have provided this information in order to obtain the benefits of [place]  I hereby authorize any physician, chiropractor, surgeon, practitioner, or other person, any hospital, including [facility] or [facility], any medical service organization, any insurance company, or other institution or organization to release to each other, any medical or other information, including benefits paid or payable, pertinent to this injury or disease, except information relative to diagnosis, treatment and/or counseling for AIDS, psychological conditions, alcohol or controlled substances, for which I must give specific authorization.  A Photostat of this authorization shall be as valid as the original.     Date 02/14/2023   [facility] Employee’s Original or  *Electronic Signature   THIS REPORT MUST BE COMPLETED AND MAILED WITHIN 3 WORKING DAYS OF TREATMENT   [facility]  Name of Facility  [place]   Date  2/14/2023 Diagnosis and Description of Injury or Occupational Disease  (Y99.0) Work related injury  (S89.91XA) Knee injury, right, initial encounter  (S86.911A) Strain of right knee, initial encounter Is there evidence the injured employee was under the influence of alcohol and/or another controlled substance at the time of accident?  ? No ? Yes (if yes, please explain)    Hour  10:52 AM   Diagnoses of Work related injury, Knee injury, right, initial encounter, and Strain of right knee, initial encounter were pertinent to this visit. No   Treatment  -Must wear knee brace while at work at all times  -Do note pull/push objects > 25 pounds  -May use over-the-counter ibuprofen or  Tylenol as needed for pain  -May elevate leg as needed for any swelling and may apply cool compress  -May use over-the-counter topical pain reliever like IcyHot with lidocaine or Salonpas as needed  -Monitor for any increased weakness with ambulation or weightbearing, swelling, bruising or deformity of knee joint-must return to urgent care for evaluation  -Recheck on Saturday, February 18, 2023    Have you advised the patient to remain off work five days or     more?    X-Ray Findings  Negative   ? Yes Indicate dates:   From   To      From information given by the employee, together with medical evidence, can        you directly connect this injury or occupational disease as job incurred?  Yes ? No If no, is the injured employee capable of:  ? full duty  No ? modified duty  Yes   Is additional medical care by a physician indicated?  Yes If Modified Duty, Specify any Limitations / Restrictions  No stand/walk > 4 hrs, no push/pull of objects > 25 pounds, no climbing   Do you know of any previous injury or disease contributing to this condition or occupational disease?  ? Yes ? No (Explain if yes)                          No   Date  2/14/2023 Print Health Care Provider's   TANO Butcher I certify the employer’s copy of  this form was mailed on:   Address  [address] [place] [address]. [address] Insurer’s Use Only     [facility] Zip  [address]    Provider’s Tax ID Number  [id number] Telephone  Dept: [phone]             Health Care Provider’s Original or Electronic Signature  e-UBALDO Del Toro.RDaiNDai Degree (MD,DO, DC,PA-C,APRN)   APRN      * Complete and attach Release of Information (Form C-4A) when injured employee signs C-4 Form electronically  ORIGINAL - TREATING HEALTHCARE PROVIDER PAGE 2 - INSURER/TPA PAGE 3 - EMPLOYER PAGE 4 - EMPLOYEE             Form C-4 (rev.08/21)           BRIEF DESCRIPTION OF RIGHTS AND BENEFITS  (Pursuant to [place] 616C.050)    Notice of Injury or Occupational  "Disease (Incident Report Form C-1): If an injury or occupational disease (OD) arises out of and in the course of employment, you must provide written notice to your employer as soon as practicable, but no later than 7 days after the accident or OD. Your employer shall maintain a sufficient supply of the required forms.    Claim for Compensation (Form C-4): If medical treatment is sought, the form C-4 is available at the place of initial treatment. A completed \"Claim for Compensation\" (Form C-4) must be filed within 90 days after an accident or OD. The treating physician or chiropractor must, within 3 working days after treatment, complete and mail to the employer, the employer's insurer and third-party , the Claim for Compensation.    Medical Treatment: If you require medical treatment for your on-the-job injury or OD, you may be required to select a physician or chiropractor from a list provided by your workers’ compensation insurer, if it has contracted with an Organization for Managed Care (MCO) or Preferred Provider Organization (PPO) or providers of health care. If your employer has not entered into a contract with an MCO or PPO, you may select a physician or chiropractor from the Panel of Physicians and Chiropractors. Any medical costs related to your industrial injury or OD will be paid by your insurer.    Temporary Total Disability (TTD): If your doctor has certified that you are unable to work for a period of at least 5 consecutive days, or 5 cumulative days in a 20-day period, or places restrictions on you that your employer does not accommodate, you may be entitled to TTD compensation.    Temporary Partial Disability (TPD): If the wage you receive upon reemployment is less than the compensation for TTD to which you are entitled, the insurer may be required to pay you TPD compensation to make up the difference. TPD can only be paid for a maximum of 24 months.    Permanent Partial Disability " (PPD): When your medical condition is stable and there is an indication of a PPD as a result of your injury or OD, within 30 days, your insurer must arrange for an evaluation by a rating physician or chiropractor to determine the degree of your PPD. The amount of your PPD award depends on the date of injury, the results of the PPD evaluation, your age and wage.    Permanent Total Disability (PTD): If you are medically certified by a treating physician or chiropractor as permanently and totally disabled and have been granted a PTD status by your insurer, you are entitled to receive monthly benefits not to exceed 66 2/3% of your average monthly wage. The amount of your PTD payments is subject to reduction if you previously received a lump-sum PPD award.    Vocational Rehabilitation Services: You may be eligible for vocational rehabilitation services if you are unable to return to the job due to a permanent physical impairment or permanent restrictions as a result of your injury or occupational disease.    Transportation and Per Catarino Reimbursement: You may be eligible for travel expenses and per catarino associated with medical treatment.    Reopening: You may be able to reopen your claim if your condition worsens after claim closure.     Appeal Process: If you disagree with a written determination issued by the insurer or the insurer does not respond to your request, you may appeal to the Department of Administration, , by following the instructions contained in your determination letter. You must appeal the determination within 70 days from the date of the determination letter at [address], [place] [address], or [address], [address], [place] [address]. If you disagree with the  decision, you may appeal to the Department of Administration, . You must file your appeal within 30 days from the date of the  decision  letter at [address], [address], [place], [place] [address], or [address], [address], [place], [place] [address]. If you disagree with a decision of an , you may file a petition for judicial review with the District Court. You must do so within 30 days of the Appeal Officer’s decision. You may be represented by an  at your own expense or you may contact the [facility] for possible representation.    [place]  for Injured Workers (NAIW): If you disagree with a  decision, you may request that NAIW represent you without charge at an  Hearing. For information regarding denial of benefits, you may contact the [facility] at: [address], [address], [place] [address], [phone], or [address], [address], [place] [address], [phone]    To File a Complaint with the Division: If you wish to file a complaint with the  of the Division of Industrial Relations (DIR),  please contact the Workers’ Compensation Section, [address], [address], [place], [place] [address], telephone [phone], or [address], [place], [place] [address], telephone [phone].    For assistance with Workers’ Compensation Issues: You may contact the [facility] Office for Consumer Health Assistance, [address], [address], [place], [place] [address], Toll Free 1-466.747.7487, Web site: http://[facility].[place].gov/Programs/TOPHER E-mail: topher@[facility].nv.gov              __________________________________________________________________                                    _________________            Employee Name / Signature                                                                                                                            Date                                                                                                                                                                                                                               D-2 (rev. 10/20)

## 2023-02-14 NOTE — PROGRESS NOTES
"Subjective     Jennifer Cueto is a 27 y.o. female who presents with Knee Injury (WC NEW DOI 2/13/2023 R knee)      DOI 2/13/2023. Pulling pallet down ramp from truck unloading and ran into pallet behind her. States jhony ran over her right foot and hit her in the right knee. Hit her lower back against the pallet at end of ramp. States pain level today 5/10. Has not taken over the counter Ibuprofen or Tylenol. No cool compress.  States pain and swelling on medial aspect of right knee joint.  Denies erythema or bruising no noted weakness/numbness/tingling in right lower extremity but does have some pain with weightbearing.  No previous injury to right lower extremity.  No secondary job noted.     Knee Injury  Associated symptoms include myalgias. Pertinent negatives include no rash or weakness.   PMH: No pertinent past medical history to this problem  MEDS: Medications were reviewed in Epic  ALLERGIES: Allergies were reviewed in Epic  FH: No pertinent family history to this problem       Review of Systems   Musculoskeletal:  Positive for joint pain and myalgias. Negative for falls.   Skin:  Negative for itching and rash.   Neurological:  Negative for tingling, sensory change and weakness.   Endo/Heme/Allergies:  Does not bruise/bleed easily.   All other systems reviewed and are negative.           Objective     /60   Pulse 80   Temp 36.8 °C (98.3 °F) (Temporal)   Resp 14   Ht 1.549 m (5' 1\")   Wt 61.7 kg (136 lb)   SpO2 97%   Breastfeeding Yes   BMI 25.70 kg/m²      Physical Exam  Vitals reviewed.   Constitutional:       General: She is not in acute distress.     Appearance: Normal appearance. She is well-developed. She is not ill-appearing, toxic-appearing or diaphoretic.   Cardiovascular:      Rate and Rhythm: Normal rate.   Pulmonary:      Effort: Pulmonary effort is normal.   Musculoskeletal:      Right knee: No swelling, deformity, effusion, erythema, ecchymosis, lacerations, bony tenderness or " crepitus. Normal range of motion. Tenderness present over the medial joint line and patellar tendon. No LCL laxity, MCL laxity, ACL laxity or PCL laxity. Normal alignment, normal meniscus and normal patellar mobility. Normal pulse.        Legs:    Skin:     General: Skin is warm and dry.      Findings: No abrasion, bruising, ecchymosis, erythema, laceration or wound.   Neurological:      Mental Status: She is alert and oriented to person, place, and time.   Psychiatric:         Attention and Perception: Attention normal.         Mood and Affect: Mood normal.         Speech: Speech normal.         Behavior: Behavior normal. Behavior is cooperative.       A/O x 3. Skin p/w/d, skin sensation intact. Vitals WNL.  No erythema, bruising or deformity at right knee joint.  Tenderness on palpation of medial aspect as well as inferior aspect of knee joint.  Antalgic gait.  No laxity in knee joint.  Medical assistant has applied knee brace to right knee.                   Assessment & Plan        1. Work related injury    - DX-KNEE COMPLETE 4+ RIGHT; Future    2. Knee injury, right, initial encounter    - DX-KNEE COMPLETE 4+ RIGHT; Future    3. Strain of right knee, initial encounter       -Must wear knee brace while at work at all times   -Do note pull/push objects > 25 pounds   -May use over-the-counter ibuprofen or Tylenol as needed for pain   -May elevate leg as needed for any swelling and may apply cool compress   -May use over-the-counter topical pain reliever like IcyHot with lidocaine or Salonpas as needed   -Monitor for any increased weakness with ambulation or weightbearing, swelling, bruising or deformity of knee joint-must return to urgent care for evaluation   -Recheck on Saturday, February 18, 2023

## 2023-02-18 ENCOUNTER — OCCUPATIONAL MEDICINE (OUTPATIENT)
Dept: URGENT CARE | Facility: PHYSICIAN GROUP | Age: 28
End: 2023-02-18
Payer: COMMERCIAL

## 2023-02-18 VITALS
WEIGHT: 135 LBS | DIASTOLIC BLOOD PRESSURE: 54 MMHG | TEMPERATURE: 98.6 F | SYSTOLIC BLOOD PRESSURE: 102 MMHG | RESPIRATION RATE: 14 BRPM | HEIGHT: 61 IN | BODY MASS INDEX: 25.49 KG/M2 | OXYGEN SATURATION: 97 % | HEART RATE: 79 BPM

## 2023-02-18 DIAGNOSIS — S89.91XA KNEE INJURY, RIGHT, INITIAL ENCOUNTER: ICD-10-CM

## 2023-02-18 DIAGNOSIS — S80.01XD CONTUSION OF RIGHT KNEE, SUBSEQUENT ENCOUNTER: ICD-10-CM
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
## 2023-02-18 PROCEDURE — 99213 OFFICE O/P EST LOW 20 MIN: CPT | Performed by: STUDENT IN AN ORGANIZED HEALTH CARE EDUCATION/TRAINING PROGRAM

## 2023-02-18 ASSESSMENT — FIBROSIS 4 INDEX: FIB4 SCORE: 0.56

## 2023-02-18 NOTE — LETTER
[facility]  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    Medical Information   Related to Industrial Injury? Yes ***   Subjective Complaints:     DOI 2/13/2023. Pulling pallet down ramp from truck unloading and ran into pallet behind her. States jhony ran over her right foot and hit her in the right knee. Hit her lower back against the pallet at end of ramp. States pain level today 5/10. Has not taken over the counter Ibuprofen or Tylenol. No cool compress.  States pain and swelling on medial aspect of right knee joint.  Denies erythema or bruising no noted weakness/numbness/tingling in right lower extremity but does have some pain with weightbearing.  No previous injury to right lower extremity.  No secondary job noted.     Today's date: 2/18/2023: Visit #2  Patient reports limited improvement of knee pain.  She has been wearing a knee brace which has helped.  She has not taken any medication for symptomatic relief.  Knee pain is aggravated when going up p steps.  Denies any mechanical symptoms.     Objective Findings: Gen: no acute distress, normal voice  Skin: dry, intact, moist mucosal membranes  Head: Atraumatic, normocephalic  Psych: normal affect, normal judgement, alert, awake  Musculoskeletal: Antalgic gait.  Tenderness palpation along the medial joint line, and medial quad.  No ligamentous instability.       Pre-Existing Condition(s):     Assessment:   Condition Same    Status: Additional Care Required   Permanent Disability:No    Plan:      Diagnostics:      Comments:       Disability Information   Status: Released to Restricted Duty    From:  2/18/2023  Through: 2/25/2023 Restrictions are: Temporary   Physical Restrictions   Sitting:    Standing:    Stooping:    Bending:      Squatting:  < or = to 2 hrs/day Walking:  < or = to 6 hrs/day Climbing:  < or = to 2 hrs/day Pushing:      Pulling:    Other:    Reaching Above Shoulder (L):   Reaching Above Shoulder (R):       Reaching Below Shoulder (L):    Reaching Below Shoulder (R):      Not to exceed Weight Limits   Carrying(hrs):   Weight Limit(lb): < or = to 25 pounds Lifting(hrs):   Weight  Limit(lb): < or = to 25 pounds   Comments: Restrictions per D39  continue knee brace  Tylenol/ibuprofen  F/u in one week      Repetitive Actions   Hands: i.e. Fine Manipulations from Grasping:     Feet: i.e. Operating Foot Controls:     Driving / Operate Machinery:     Health Care Provider’s Original or Electronic Signature  Ambrosio Crisostomo D.O. Health Care Provider’s Original or Electronic Signature    Neil De Los Santos DO MPH     Clinic Name / Location: [address]  [place] [address] Clinic Phone Number: Dept: [phone]   Appointment Time: 9:00 Am Visit Start Time: 9:17 AM   Check-In Time:  9:07 Am Visit Discharge Time:  ***   Original-Treating Physician or Chiropractor    Page 2-Insurer/TPA    Page 3-Employer    Page 4-Employee

## 2023-02-18 NOTE — PROGRESS NOTES
"Subjective:     Jennifer Cueto is a 27 y.o. female who presents for Follow-Up (Workers comp follow up, DOI 2/13/23, right knee )         DOI 2/13/2023. Pulling pallet down ramp from truck unloading and ran into pallet behind her. States jhony ran over her right foot and hit her in the right knee. Hit her lower back against the pallet at end of ramp. States pain level today 5/10. Has not taken over the counter Ibuprofen or Tylenol. No cool compress.  States pain and swelling on medial aspect of right knee joint.  Denies erythema or bruising no noted weakness/numbness/tingling in right lower extremity but does have some pain with weightbearing.  No previous injury to right lower extremity.  No secondary job noted.     Today's date: 2/18/2023: Visit #2  Patient reports limited improvement of knee pain.  She has been wearing a knee brace which has helped.  She has not taken any medication for symptomatic relief.  Knee pain is aggravated when going up p steps.  Denies any mechanical symptoms.      PMH:   No pertinent past medical history to this problem  MEDS:  Medications were reviewed in EMR  ALLERGIES:  Allergies were reviewed in EMR  FH:   No pertinent family history to this problem       Objective:     /54   Pulse 79   Temp 37 °C (98.6 °F) (Temporal)   Resp 14   Ht 1.549 m (5' 1\")   Wt 61.2 kg (135 lb)   LMP 01/10/2023 (Approximate)   SpO2 97%   BMI 25.51 kg/m²     Gen: no acute distress, normal voice  Skin: dry, intact, moist mucosal membranes  Head: Atraumatic, normocephalic  Psych: normal affect, normal judgement, alert, awake  Musculoskeletal: Antalgic gait.  Tenderness palpation along the medial joint line, and medial quad.  No ligamentous instability.        Assessment/Plan:       1. Knee injury, right, initial encounter    2. Contusion of right knee, subsequent encounter    3. Work related injury    Released to Restricted Duty FROM 2/18/2023 TO 2/25/2023  Restrictions per D39  continue knee " brace  Tylenol/ibuprofen  F/u in one week         Differential diagnosis, natural history, supportive care, and indications for immediate follow-up discussed.

## 2023-02-18 NOTE — LETTER
[facility]  [address] - [place] [address]  Phone:  [phone] - Fax:  [phone]   [facility] Progress Report and Disability Certification  Date of Service: 2/18/2023   No Show:  No  Date / Time of Next Visit: 2/25/2023 11:00AM   Claim Information   Patient Name: Jennifer Cueto  Claim Number:     Employer: [id number]  Date of Injury: 2/13/2023     Insurer / TPA: [employer]  ID / SSN:     Occupation: Stocking  Diagnosis: Diagnoses of Knee injury, right, initial encounter, Contusion of right knee, subsequent encounter, and Work related injury were pertinent to this visit.    Medical Information   Related to Industrial Injury? Yes    Subjective Complaints:     DOI 2/13/2023. Pulling pallet down ramp from truck unloading and ran into pallet behind her. States jhony ran over her right foot and hit her in the right knee. Hit her lower back against the pallet at end of ramp. States pain level today 5/10. Has not taken over the counter Ibuprofen or Tylenol. No cool compress.  States pain and swelling on medial aspect of right knee joint.  Denies erythema or bruising no noted weakness/numbness/tingling in right lower extremity but does have some pain with weightbearing.  No previous injury to right lower extremity.  No secondary job noted.     Today's date: 2/18/2023: Visit #2  Patient reports limited improvement of knee pain.  She has been wearing a knee brace which has helped.  She has not taken any medication for symptomatic relief.  Knee pain is aggravated when going up p steps.  Denies any mechanical symptoms.     Objective Findings: Gen: no acute distress, normal voice  Skin: dry, intact, moist mucosal membranes  Head: Atraumatic, normocephalic  Psych: normal affect, normal judgement, alert, awake  Musculoskeletal: Antalgic gait.  Tenderness palpation along the medial joint line, and medial quad.  No ligamentous  instability.       Pre-Existing Condition(s):     Assessment:   Condition Same    Status: Additional Care Required  Permanent Disability:No    Plan:      Diagnostics:      Comments:       Disability Information   Status: Released to Restricted Duty    From:  2/18/2023  Through: 2/25/2023 Restrictions are: Temporary   Physical Restrictions   Sitting:    Standing:    Stooping:    Bending:      Squatting:  < or = to 2 hrs/day Walking:  < or = to 6 hrs/day Climbing:  < or = to 2 hrs/day Pushing:      Pulling:    Other:    Reaching Above Shoulder (L):   Reaching Above Shoulder (R):       Reaching Below Shoulder (L):    Reaching Below Shoulder (R):      Not to exceed Weight Limits   Carrying(hrs):   Weight Limit(lb): < or = to 25 pounds Lifting(hrs):   Weight  Limit(lb): < or = to 25 pounds   Comments: Restrictions per D39  continue knee brace  Tylenol/ibuprofen  F/u in one week      Repetitive Actions   Hands: i.e. Fine Manipulations from Grasping:     Feet: i.e. Operating Foot Controls:     Driving / Operate Machinery:     Health Care Provider’s Original or Electronic Signature  Ambrosio Crisostomo D.O. Health Care Provider’s Original or Electronic Signature    Neil De Los Santos DO MPH     Clinic Name / Location: [address]  [place] [address] Clinic Phone Number: Dept: [phone]   Appointment Time: 9:00 Am Visit Start Time: 9:17 AM   Check-In Time:  9:07 Am Visit Discharge Time:  9:50AM   Original-Treating Physician or Chiropractor    Page 2-Insurer/TPA    Page 3-Employer    Page 4-Employee

## 2023-02-25 ENCOUNTER — OCCUPATIONAL MEDICINE (OUTPATIENT)
Dept: URGENT CARE | Facility: PHYSICIAN GROUP | Age: 28
End: 2023-02-25
Payer: COMMERCIAL

## 2023-02-25 VITALS
SYSTOLIC BLOOD PRESSURE: 102 MMHG | WEIGHT: 136.2 LBS | TEMPERATURE: 98.4 F | DIASTOLIC BLOOD PRESSURE: 52 MMHG | OXYGEN SATURATION: 97 % | HEART RATE: 87 BPM | RESPIRATION RATE: 20 BRPM | BODY MASS INDEX: 25.71 KG/M2 | HEIGHT: 61 IN

## 2023-02-25 DIAGNOSIS — S80.01XD CONTUSION OF RIGHT KNEE, SUBSEQUENT ENCOUNTER: ICD-10-CM

## 2023-02-25 DIAGNOSIS — S89.90XD KNEE INJURY, SUBSEQUENT ENCOUNTER: ICD-10-CM

## 2023-02-25 PROCEDURE — 99213 OFFICE O/P EST LOW 20 MIN: CPT

## 2023-02-25 ASSESSMENT — FIBROSIS 4 INDEX: FIB4 SCORE: 0.56

## 2023-02-25 ASSESSMENT — PAIN SCALES - GENERAL: PAINLEVEL: NO PAIN

## 2023-02-25 NOTE — LETTER
"   [facility] 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    Medical Information   Related to Industrial Injury? Yes    Subjective Complaints:  Knee Injury  The current episode started 1 to 4 weeks ago. The problem has been rapidly improving. The symptoms are aggravated by exertion, walking and twisting. She has tried acetaminophen and NSAIDs for the symptoms. The treatment provided significant relief.   DOI: 2/13/2023  FRANKLIN: Right knee injury    This is patient's third visit for right knee injury on 2/13/2023.  Right knee x-ray was normal.  She reports improvement in pain and function of greater than 80%.  She is taking Tylenol and/or ibuprofen as needed for pain, providing relief. She has been using the knee brace, providing significant relief. She was placed on restricted duty, with weight limit of less than 25 pounds. She denies any numbness, tingling, or weakness on lower extremities.    Objective Findings:   /52 (BP Location: Right arm, Patient Position: Sitting, BP Cuff Size: Adult)   Pulse 87   Temp 36.9 °C (98.4 °F) (Temporal)   Resp 20   Ht 1.549 m (5' 1\")   Wt 61.8 kg (136 lb 3.2 oz)   LMP 01/10/2023 (Approximate)   SpO2 97%   BMI 25.73 kg/m²      Physical Exam  Constitutional:       Appearance: Normal appearance.   HENT:      Head: Normocephalic.      Nose: Nose normal.   Eyes:      Extraocular Movements: Extraocular movements intact.   Cardiovascular:      Rate and Rhythm: Normal rate.      Pulses: Normal pulses. "   Pulmonary:      Effort: Pulmonary effort is normal.   Musculoskeletal:         General: Normal range of motion.      Cervical back: Normal range of motion.      Right knee: No swelling. Normal range of motion. No tenderness.      Left knee: Normal.   Skin:     General: Skin is warm and dry.   Neurological:      General: No focal deficit present.      Mental Status: She is alert.   Psychiatric:         Mood and Affect: Mood normal.         Behavior: Behavior normal.         Judgment: Judgment normal.      Pre-Existing Condition(s):     Assessment:   Condition Improved    Status: Discharged /  MMI  Permanent Disability:No    Plan:      Diagnostics:      Comments:       Disability Information   Status:      From:     Through:   Restrictions are:     Physical Restrictions   Sitting:    Standing:    Stooping:    Bending:      Squatting:    Walking:    Climbing:    Pushing:      Pulling:    Other:    Reaching Above Shoulder (L):   Reaching Above Shoulder (R):       Reaching Below Shoulder (L):    Reaching Below Shoulder (R):      Not to exceed Weight Limits   Carrying(hrs):   Weight Limit(lb):   Lifting(hrs):   Weight  Limit(lb):     Comments:   Patient is discharged from care and released to full duty effective today  May use knee brace to right knee as needed  Ibuprofen/or Tylenol as needed for pain relief.  Ice/heat as needed.  Topical analgesic/patches as needed.  May return to clinic with worsening symptoms      Repetitive Actions   Hands: i.e. Fine Manipulations from Grasping:     Feet: i.e. Operating Foot Controls:     Driving / Operate Machinery:     Health Care Provider’s Original or Electronic Signature  TANO Alcala Health Care Provider’s Original or Electronic Signature    Neil De Los Santos DO MPH     Clinic Name / Location: [address]  [place] [address] Clinic Phone Number: Dept: [phone]   Appointment Time: 11:00 Am Visit Start Time: 1:01 PM    Check-In Time:  11:01 Am Visit Discharge Time:  1:39PM   Original-Treating Physician or Chiropractor    Page 2-Insurer/TPA    Page 3-Employer    Page 4-Employee

## 2023-02-25 NOTE — PROGRESS NOTES
Subjective     Jennifer Cueto is a 27 y.o. female who presents with Follow-Up (Workers Comp) and Knee Injury (Right Knee)            Knee Injury  The current episode started 1 to 4 weeks ago. The problem has been rapidly improving. The symptoms are aggravated by exertion, walking and twisting. She has tried acetaminophen and NSAIDs for the symptoms. The treatment provided significant relief.   DOI: 2/13/2023  FRANKLIN: Right knee injury    This is patient's third visit for right knee injury on 2/13/2023.  Right knee x-ray was normal.  She reports improvement in pain and function of greater than 80%.  She is taking Tylenol and/or ibuprofen as needed for pain, providing relief. She has been using the knee brace, providing significant relief. She was placed on restricted duty, with weight limit of less than 25 pounds. She denies any numbness, tingling, or weakness on lower extremities.     Patient's current problem list, medications, and past medical/surgical history were reviewed in Epic.    PMH:  has no past medical history of Addisons disease ([facility]), Adrenal disorder ([facility]), Allergy, Anemia, Anxiety, Arrhythmia, Arthritis, Asthma, Blood transfusion without reported diagnosis, Cancer ([facility]), Cataract, CHF (congestive heart failure) ([facility]), Clotting disorder ([facility]), COPD (chronic obstructive pulmonary disease) ([facility]), Cushings syndrome ([facility]), Depression, Diabetes ([facility]), Diabetic neuropathy ([facility]), GERD (gastroesophageal reflux disease), Glaucoma, Goiter, Head ache, Heart attack ([facility]), Heart murmur, HIV (human immunodeficiency virus infection) ([facility]), Hyperlipidemia, Hypertension, IBD (inflammatory bowel disease), Kidney disease, Meningitis, Migraine, Muscle disorder, Osteoporosis, Parathyroid disorder ([facility]), Pituitary disease ([facility]), Pulmonary emphysema (HCC), Seizure ([facility]), Sickle cell disease ([facility]), Stroke ([facility]), Substance abuse ([facility]), Thyroid disease, Tuberculosis, or Urinary tract infection.  MEDS:   Current Outpatient  "Medications:     ibuprofen (MOTRIN) 800 MG Tab, Take 1 Tablet by mouth every 6 hours as needed., Disp: 30 Tablet, Rfl: 0    acetaminophen (TYLENOL) 325 MG Tab, Take 2 Tablets by mouth every four hours as needed., Disp: 30 Tablet, Rfl: 0  ALLERGIES: No Known Allergies  SURGHX: No past surgical history on file.  SOCHX:  reports that she has never smoked. She has never used smokeless tobacco. She reports that she does not drink alcohol and does not use drugs.  FH: Reviewed with patient, not pertinent to this visit.     Review of Systems   Musculoskeletal:         Right knee pain   All other systems reviewed and are negative.           Objective     /52 (BP Location: Right arm, Patient Position: Sitting, BP Cuff Size: Adult)   Pulse 87   Temp 36.9 °C (98.4 °F) (Temporal)   Resp 20   Ht 1.549 m (5' 1\")   Wt 61.8 kg (136 lb 3.2 oz)   LMP 01/10/2023 (Approximate)   SpO2 97%   BMI 25.73 kg/m²      Physical Exam  Constitutional:       Appearance: Normal appearance.   HENT:      Head: Normocephalic.      Nose: Nose normal.   Eyes:      Extraocular Movements: Extraocular movements intact.   Cardiovascular:      Rate and Rhythm: Normal rate.      Pulses: Normal pulses.   Pulmonary:      Effort: Pulmonary effort is normal.   Musculoskeletal:         General: Normal range of motion.      Cervical back: Normal range of motion.      Right knee: No swelling. Normal range of motion. No tenderness.      Left knee: Normal.   Skin:     General: Skin is warm and dry.   Neurological:      General: No focal deficit present.      Mental Status: She is alert.   Psychiatric:         Mood and Affect: Mood normal.         Behavior: Behavior normal.         Judgment: Judgment normal.                           Assessment & Plan     1. Contusion of right knee, subsequent encounter      2. Knee injury, subsequent encounter       Patient is discharged from care and released to full duty effective today  May use knee brace to right knee " as needed  Ibuprofen/or Tylenol as needed for pain relief.  Ice/heat as needed.  Topical analgesic/patches as needed.  May return to clinic with worsening symptoms        D39 paperwork completed.    Electronically Signed by JCAKIE Oakley

## 2023-09-23 ENCOUNTER — HOSPITAL ENCOUNTER (OUTPATIENT)
Facility: MEDICAL CENTER | Age: 28
End: 2023-09-23
Payer: COMMERCIAL

## 2023-09-23 ENCOUNTER — OFFICE VISIT (OUTPATIENT)
Dept: URGENT CARE | Facility: CLINIC | Age: 28
End: 2023-09-23
Payer: COMMERCIAL

## 2023-09-23 VITALS
RESPIRATION RATE: 16 BRPM | WEIGHT: 137.4 LBS | BODY MASS INDEX: 26.97 KG/M2 | DIASTOLIC BLOOD PRESSURE: 76 MMHG | HEIGHT: 60 IN | HEART RATE: 86 BPM | OXYGEN SATURATION: 97 % | TEMPERATURE: 97.6 F | SYSTOLIC BLOOD PRESSURE: 100 MMHG

## 2023-09-23 DIAGNOSIS — L02.91 ABSCESS: ICD-10-CM

## 2023-09-23 PROCEDURE — 87147 CULTURE TYPE IMMUNOLOGIC: CPT

## 2023-09-23 PROCEDURE — 3078F DIAST BP <80 MM HG: CPT

## 2023-09-23 PROCEDURE — 87070 CULTURE OTHR SPECIMN AEROBIC: CPT

## 2023-09-23 PROCEDURE — 87205 SMEAR GRAM STAIN: CPT

## 2023-09-23 PROCEDURE — 3074F SYST BP LT 130 MM HG: CPT

## 2023-09-23 PROCEDURE — 87186 SC STD MICRODIL/AGAR DIL: CPT

## 2023-09-23 PROCEDURE — 99214 OFFICE O/P EST MOD 30 MIN: CPT

## 2023-09-23 PROCEDURE — 87077 CULTURE AEROBIC IDENTIFY: CPT

## 2023-09-23 RX ORDER — CLINDAMYCIN HYDROCHLORIDE 150 MG/1
450 CAPSULE ORAL 3 TIMES DAILY
Qty: 45 CAPSULE | Refills: 0 | Status: SHIPPED | OUTPATIENT
Start: 2023-09-23 | End: 2023-09-28

## 2023-09-23 ASSESSMENT — FIBROSIS 4 INDEX: FIB4 SCORE: 0.59

## 2023-09-24 DIAGNOSIS — L02.91 ABSCESS: ICD-10-CM

## 2023-09-24 NOTE — PROGRESS NOTES
Subjective:   Jennifer Cueto is a 28 y.o. female who presents for Abscess ((R) armpit X 2 days )      HPI:    Patient presents to urgent care with concerns of abscesses in her right axilla  Patient states she has had this happen several years ago and went away with warm compresses, oral antibiotics.   States abscesses appeared 2 days ago  No improvement with NSAIDs/Tylenol, warm packs  Requesting the largest abscess be lanced for pain control.  Denies fever, chills, spontaneous drainage      ROS As above in HPI    Medications:    Current Outpatient Medications on File Prior to Visit   Medication Sig Dispense Refill    ibuprofen (MOTRIN) 800 MG Tab Take 1 Tablet by mouth every 6 hours as needed. 30 Tablet 0    acetaminophen (TYLENOL) 325 MG Tab Take 2 Tablets by mouth every four hours as needed. 30 Tablet 0     No current facility-administered medications on file prior to visit.        Allergies:   Patient has no known allergies.    Problem List:   Patient Active Problem List   Diagnosis    Encounter for supervision of other normal pregnancy, third trimester    Lactating mother    Urinary tract infection in mother during second trimester of pregnancy    Abnormal pregnancy US        Surgical History:  No past surgical history on file.    Past Social Hx:   Social History     Tobacco Use    Smoking status: Never    Smokeless tobacco: Never   Vaping Use    Vaping Use: Never used   Substance Use Topics    Alcohol use: Never    Drug use: Never          Problem list, medications, and allergies reviewed by myself today in Epic.     Objective:     /76 (BP Location: Left arm, Patient Position: Sitting, BP Cuff Size: Adult)   Pulse 86   Temp 36.4 °C (97.6 °F) (Temporal)   Resp 16   Ht 1.524 m (5')   Wt 62.3 kg (137 lb 6.4 oz)   SpO2 97%   BMI 26.83 kg/m²     Physical Exam  Vitals and nursing note reviewed.   Constitutional:       General: She is not in acute distress.     Appearance: Normal appearance. She is not  ill-appearing or diaphoretic.   HENT:      Head: Normocephalic and atraumatic.   Cardiovascular:      Rate and Rhythm: Normal rate and regular rhythm.      Heart sounds: Normal heart sounds.   Pulmonary:      Effort: Pulmonary effort is normal. No respiratory distress.      Breath sounds: Normal breath sounds. No stridor. No wheezing, rhonchi or rales.   Chest:      Chest wall: No tenderness.          Comments: Right axilla has large abscess, approximately 3 in x 4 in, near the axillary fossa. There is a a secondary or possible tracking cyst just medial and deeper to the primary and larger abscess. There is erythema, edema, warmth, fluctuance, and tenderness to palpation. Peeling skin is present over the primary lesion, possibly suggesting lesion has been present more than 2 days.  Musculoskeletal:         General: Normal range of motion.   Skin:     General: Skin is warm and dry.      Capillary Refill: Capillary refill takes less than 2 seconds.      Findings: No rash.   Neurological:      Mental Status: She is alert and oriented to person, place, and time.         Assessment/Plan:     Diagnosis and associated orders:   1. Abscess  - CULTURE WOUND W/ GRAM STAIN; Future  - clindamycin (CLEOCIN) 150 MG Cap; Take 3 Capsules by mouth 3 times a day for 5 days.  Dispense: 45 Capsule; Refill: 0  - Incision and Drainage  - Referral to Dermatology        Comments/MDM:     Incision and drainage performed, see procedure note below.   Wound culture obtained  Will start patient on clindamycin as she is currently breastfeeding.  Referral to dermatology ordered   Return to  in 2 days for wound check  Strict ER precautions for fever, chills, dizziness  Patient verbalized understanding and consented to plan of care      Procedure: I&D:  Dimension: 3 in x 4 in  Location: Right axilla  Verbal consent obtained  Local anesthesia achieved with approximately 3 cc of 1% lidocaine without epi.   Area cleansed with povidone iodine  3 mm  incision made with sterio #11 blade scalpel. Large amount of whitish yellowish thick material expressed.   Hemostasis achieved with compression  Wound dressed with 4x4 dry guaze, sterile dressed.   Patient tolerated well.         Please note that this dictation was created using voice recognition software. I have made a reasonable attempt to correct obvious errors, but I expect that there are errors of grammar and possibly content that I did not discover before finalizing the note.    This note was electronically signed by JACKIE Mays

## 2023-09-25 LAB
GRAM STN SPEC: NORMAL
SIGNIFICANT IND 70042: NORMAL
SITE SITE: NORMAL
SOURCE SOURCE: NORMAL

## 2023-09-26 LAB
BACTERIA WND AEROBE CULT: ABNORMAL
BACTERIA WND AEROBE CULT: ABNORMAL
GRAM STN SPEC: ABNORMAL
SIGNIFICANT IND 70042: ABNORMAL
SITE SITE: ABNORMAL
SOURCE SOURCE: ABNORMAL

## 2024-05-21 ENCOUNTER — APPOINTMENT (OUTPATIENT)
Dept: RADIOLOGY | Facility: MEDICAL CENTER | Age: 29
End: 2024-05-21
Attending: EMERGENCY MEDICINE
Payer: COMMERCIAL

## 2024-05-21 ENCOUNTER — OFFICE VISIT (OUTPATIENT)
Dept: URGENT CARE | Facility: PHYSICIAN GROUP | Age: 29
End: 2024-05-21
Payer: COMMERCIAL

## 2024-05-21 ENCOUNTER — HOSPITAL ENCOUNTER (EMERGENCY)
Facility: MEDICAL CENTER | Age: 29
End: 2024-05-21
Attending: EMERGENCY MEDICINE
Payer: COMMERCIAL

## 2024-05-21 ENCOUNTER — HOSPITAL ENCOUNTER (OUTPATIENT)
Facility: MEDICAL CENTER | Age: 29
End: 2024-05-21
Attending: NURSE PRACTITIONER
Payer: COMMERCIAL

## 2024-05-21 VITALS
RESPIRATION RATE: 16 BRPM | WEIGHT: 136.4 LBS | DIASTOLIC BLOOD PRESSURE: 52 MMHG | OXYGEN SATURATION: 97 % | HEIGHT: 60 IN | SYSTOLIC BLOOD PRESSURE: 92 MMHG | HEART RATE: 83 BPM | TEMPERATURE: 98.7 F | BODY MASS INDEX: 26.78 KG/M2

## 2024-05-21 VITALS
HEIGHT: 65 IN | OXYGEN SATURATION: 100 % | TEMPERATURE: 97.1 F | SYSTOLIC BLOOD PRESSURE: 92 MMHG | RESPIRATION RATE: 16 BRPM | BODY MASS INDEX: 22.96 KG/M2 | WEIGHT: 137.79 LBS | DIASTOLIC BLOOD PRESSURE: 51 MMHG | HEART RATE: 63 BPM

## 2024-05-21 DIAGNOSIS — N30.01 ACUTE CYSTITIS WITH HEMATURIA: ICD-10-CM

## 2024-05-21 DIAGNOSIS — Z32.01 POSITIVE PREGNANCY TEST: ICD-10-CM

## 2024-05-21 DIAGNOSIS — R10.32 LEFT LOWER QUADRANT ABDOMINAL PAIN: ICD-10-CM

## 2024-05-21 DIAGNOSIS — N94.89 PELVIC CONGESTION SYNDROME: ICD-10-CM

## 2024-05-21 DIAGNOSIS — Z3A.01 LESS THAN 8 WEEKS GESTATION OF PREGNANCY: ICD-10-CM

## 2024-05-21 DIAGNOSIS — R10.30 LOWER ABDOMINAL PAIN: ICD-10-CM

## 2024-05-21 LAB
ALBUMIN SERPL BCP-MCNC: 4.3 G/DL (ref 3.2–4.9)
ALBUMIN/GLOB SERPL: 1.3 G/DL
ALP SERPL-CCNC: 60 U/L (ref 30–99)
ALT SERPL-CCNC: 18 U/L (ref 2–50)
ANION GAP SERPL CALC-SCNC: 13 MMOL/L (ref 7–16)
APPEARANCE UR: CLEAR
APPEARANCE UR: NORMAL
AST SERPL-CCNC: 16 U/L (ref 12–45)
B-HCG SERPL-ACNC: ABNORMAL MIU/ML (ref 0–5)
BACTERIA #/AREA URNS HPF: ABNORMAL /HPF
BASOPHILS # BLD AUTO: 0.5 % (ref 0–1.8)
BASOPHILS # BLD: 0.04 K/UL (ref 0–0.12)
BILIRUB SERPL-MCNC: 0.5 MG/DL (ref 0.1–1.5)
BILIRUB UR QL STRIP.AUTO: NEGATIVE
BILIRUB UR STRIP-MCNC: NORMAL MG/DL
BUN SERPL-MCNC: 8 MG/DL (ref 8–22)
CALCIUM ALBUM COR SERPL-MCNC: 9.1 MG/DL (ref 8.5–10.5)
CALCIUM SERPL-MCNC: 9.3 MG/DL (ref 8.5–10.5)
CHLORIDE SERPL-SCNC: 104 MMOL/L (ref 96–112)
CO2 SERPL-SCNC: 20 MMOL/L (ref 20–33)
COLOR UR AUTO: NORMAL
COLOR UR: YELLOW
CREAT SERPL-MCNC: 0.4 MG/DL (ref 0.5–1.4)
EOSINOPHIL # BLD AUTO: 0.04 K/UL (ref 0–0.51)
EOSINOPHIL NFR BLD: 0.5 % (ref 0–6.9)
EPI CELLS #/AREA URNS HPF: ABNORMAL /HPF
ERYTHROCYTE [DISTWIDTH] IN BLOOD BY AUTOMATED COUNT: 36.9 FL (ref 35.9–50)
GFR SERPLBLD CREATININE-BSD FMLA CKD-EPI: 137 ML/MIN/1.73 M 2
GLOBULIN SER CALC-MCNC: 3.4 G/DL (ref 1.9–3.5)
GLUCOSE SERPL-MCNC: 97 MG/DL (ref 65–99)
GLUCOSE UR STRIP.AUTO-MCNC: NEGATIVE MG/DL
GLUCOSE UR STRIP.AUTO-MCNC: NORMAL MG/DL
HCT VFR BLD AUTO: 34 % (ref 37–47)
HGB BLD-MCNC: 12.4 G/DL (ref 12–16)
HYALINE CASTS #/AREA URNS LPF: ABNORMAL /LPF
IMM GRANULOCYTES # BLD AUTO: 0.02 K/UL (ref 0–0.11)
IMM GRANULOCYTES NFR BLD AUTO: 0.2 % (ref 0–0.9)
KETONES UR STRIP.AUTO-MCNC: ABNORMAL MG/DL
KETONES UR STRIP.AUTO-MCNC: NORMAL MG/DL
LEUKOCYTE ESTERASE UR QL STRIP.AUTO: NEGATIVE
LEUKOCYTE ESTERASE UR QL STRIP.AUTO: NORMAL
LIPASE SERPL-CCNC: 18 U/L (ref 11–82)
LYMPHOCYTES # BLD AUTO: 2.23 K/UL (ref 1–4.8)
LYMPHOCYTES NFR BLD: 26.4 % (ref 22–41)
MCH RBC QN AUTO: 30.7 PG (ref 27–33)
MCHC RBC AUTO-ENTMCNC: 36.5 G/DL (ref 32.2–35.5)
MCV RBC AUTO: 84.2 FL (ref 81.4–97.8)
MICRO URNS: ABNORMAL
MONOCYTES # BLD AUTO: 0.45 K/UL (ref 0–0.85)
MONOCYTES NFR BLD AUTO: 5.3 % (ref 0–13.4)
NEUTROPHILS # BLD AUTO: 5.67 K/UL (ref 1.82–7.42)
NEUTROPHILS NFR BLD: 67.1 % (ref 44–72)
NITRITE UR QL STRIP.AUTO: NEGATIVE
NITRITE UR QL STRIP.AUTO: NORMAL
NRBC # BLD AUTO: 0 K/UL
NRBC BLD-RTO: 0 /100 WBC (ref 0–0.2)
NUMBER OF RH DOSES IND 8505RD: NORMAL
PH UR STRIP.AUTO: 6 [PH] (ref 5–8)
PH UR STRIP.AUTO: 6.5 [PH] (ref 5–8)
PLATELET # BLD AUTO: 277 K/UL (ref 164–446)
PMV BLD AUTO: 10.3 FL (ref 9–12.9)
POCT INT CON NEG: NEGATIVE
POCT INT CON POS: POSITIVE
POCT URINE PREGNANCY TEST: POSITIVE
POTASSIUM SERPL-SCNC: 3.6 MMOL/L (ref 3.6–5.5)
PROT SERPL-MCNC: 7.7 G/DL (ref 6–8.2)
PROT UR QL STRIP: NEGATIVE MG/DL
PROT UR QL STRIP: NORMAL MG/DL
RBC # BLD AUTO: 4.04 M/UL (ref 4.2–5.4)
RBC # URNS HPF: ABNORMAL /HPF
RBC UR QL AUTO: ABNORMAL
RBC UR QL AUTO: NORMAL
RH BLD: NORMAL
SODIUM SERPL-SCNC: 137 MMOL/L (ref 135–145)
SP GR UR STRIP.AUTO: 1.01
SP GR UR STRIP.AUTO: 1.01
UROBILINOGEN UR STRIP-MCNC: 1 MG/DL
UROBILINOGEN UR STRIP.AUTO-MCNC: 0.2 MG/DL
WBC # BLD AUTO: 8.5 K/UL (ref 4.8–10.8)
WBC #/AREA URNS HPF: ABNORMAL /HPF

## 2024-05-21 PROCEDURE — 99214 OFFICE O/P EST MOD 30 MIN: CPT | Performed by: NURSE PRACTITIONER

## 2024-05-21 PROCEDURE — 81025 URINE PREGNANCY TEST: CPT | Performed by: NURSE PRACTITIONER

## 2024-05-21 PROCEDURE — 81002 URINALYSIS NONAUTO W/O SCOPE: CPT | Performed by: NURSE PRACTITIONER

## 2024-05-21 ASSESSMENT — PAIN DESCRIPTION - PAIN TYPE: TYPE: ACUTE PAIN

## 2024-05-21 ASSESSMENT — FIBROSIS 4 INDEX
FIB4 SCORE: 0.61
FIB4 SCORE: 0.61

## 2024-05-21 NOTE — ED NOTES
Patient provided discharge instructions, verbalizes understanding and denies any further questions. Patient instructed to follow up with [facility]. No distress noted. Patient ambulated to the front lobby with a steady gait and all belongings.

## 2024-05-21 NOTE — ED TRIAGE NOTES
.  Chief Complaint   Patient presents with    Abdominal Pain     Seen at Urgent Care today for same complaint. Pt is pregnant unsure how far along, +UTI.       Pt ambulate to triage with above complaint.    Pt educated on triage process.

## 2024-05-21 NOTE — ED PROVIDER NOTES
CHIEF COMPLAINT  Chief Complaint   Patient presents with    Abdominal Pain     Seen at Urgent Care today for same complaint. Pt is pregnant unsure how far along, +UTI.        LIMITATION TO HISTORY   Select: none    HPI    Jennifer Cueto is a 29 y.o. female who presents to the Emergency Department as a transfer from the urgent care.  The patient apparently is a G6, P5 female who just found out she was pregnant today.  The patient states a year ago after her last pregnancy and delivery she has been having continuous lower abdominal/pelvic type pain.  She went to the urgent care today where she had a urinalysis done and is concerned for the possibility of urinary tract infection and she was pregnant and they are concerned about the possibility of an ectopic pregnancy based on her symptomatology so the patient was sent to the emergency department for evaluation.  Upon arrival here she describes that she has been having just this lower abdominal pain has been intermittent for the past year or more.  Denies any overt fevers chills frequency or any other symptomatology.  She just found out that she was pregnant today.  Patient's last menstrual cycle was about a month and a half ago.  Patient denies any other symptoms.  OUTSIDE HISTORIAN(S):  Select: None    EXTERNAL RECORDS REVIEWED  Select: Other primary care/urgent care visit today with a positive pregnancy there was also a urine dip that was positive for nitrates and leukocytes.      PAST MEDICAL HISTORY  History reviewed. No pertinent past medical history.  .    SURGICAL HISTORY  History reviewed. No pertinent surgical history.      FAMILY HISTORY  Family History   Problem Relation Age of Onset    Diabetes Paternal Grandmother     Diabetes Paternal Grandfather           SOCIAL HISTORY  Social History     Socioeconomic History    Marital status: Single     Spouse name: Not on file    Number of children: Not on file    Years of education: Not on file    Highest education  "level: Not on file   Occupational History    Not on file   Tobacco Use    Smoking status: Never    Smokeless tobacco: Never   Vaping Use    Vaping status: Never Used   Substance and Sexual Activity    Alcohol use: Never    Drug use: Never    Sexual activity: Yes     Partners: Male     Comment: None    Other Topics Concern    Not on file   Social History Narrative    Not on file     Social Determinants of Health     Financial Resource Strain: Not on file   Food Insecurity: Not on file   Transportation Needs: Not on file   Physical Activity: Not on file   Stress: Not on file   Social Connections: Not on file   Intimate Partner Violence: Not on file   Housing Stability: Not on file         CURRENT MEDICATIONS  No current facility-administered medications on file prior to encounter.     Current Outpatient Medications on File Prior to Encounter   Medication Sig Dispense Refill    ibuprofen (MOTRIN) 800 MG Tab Take 1 Tablet by mouth every 6 hours as needed. 30 Tablet 0    acetaminophen (TYLENOL) 325 MG Tab Take 2 Tablets by mouth every four hours as needed. 30 Tablet 0           ALLERGIES  No Known Allergies    PHYSICAL EXAM  VITAL SIGNS:BP 93/54   Pulse 61   Temp 36.4 °C (97.5 °F) (Temporal)   Resp 19   Ht 1.651 m (5' 5\")   Wt 62.5 kg (137 lb 12.6 oz)   SpO2 100%   BMI 22.93 kg/m²     Constitutional:  Well-developed no acute distress   HENT: Normocephalic, Atraumatic, Bilateral external ears normal.  Eyes:  conjunctiva are normal.   Neck: Supple.  Nontender midline  Cardiovascular: Regular rate and rhythm without murmurs gallops or rubs.   Thorax & Lungs: No respiratory distress. Breathing comfortably. Lungs are clear to auscultation bilaterally, there are no wheezes no rales. Chest wall is nontender.  Abdomen: Soft, non distended, no significant tenderness on examination.  No rebound tenderness.  Skin: Warm, Dry, No erythema,   Back: No tenderness, No CVA tenderness.  Musculoskeletal: No clubbing cyanosis or " edema good range of motion   Neurologic: Alert & oriented x 3, normal sensation moving all extremities appears normal   Psychiatric: Affect normal, Judgment normal, Mood normal.       DIAGNOSTIC STUDIES / PROCEDURES      LABS  Results for orders placed or performed during the hospital encounter of 05/21/24   CBC WITH DIFFERENTIAL   Result Value Ref Range    WBC 8.5 4.8 - 10.8 K/uL    RBC 4.04 (L) 4.20 - 5.40 M/uL    Hemoglobin 12.4 12.0 - 16.0 g/dL    Hematocrit 34.0 (L) 37.0 - 47.0 %    MCV 84.2 81.4 - 97.8 fL    MCH 30.7 27.0 - 33.0 pg    MCHC 36.5 (H) 32.2 - 35.5 g/dL    RDW 36.9 35.9 - 50.0 fL    Platelet Count 277 164 - 446 K/uL    MPV 10.3 9.0 - 12.9 fL    Neutrophils-Polys 67.10 44.00 - 72.00 %    Lymphocytes 26.40 22.00 - 41.00 %    Monocytes 5.30 0.00 - 13.40 %    Eosinophils 0.50 0.00 - 6.90 %    Basophils 0.50 0.00 - 1.80 %    Immature Granulocytes 0.20 0.00 - 0.90 %    Nucleated RBC 0.00 0.00 - 0.20 /100 WBC    Neutrophils (Absolute) 5.67 1.82 - 7.42 K/uL    Lymphs (Absolute) 2.23 1.00 - 4.80 K/uL    Monos (Absolute) 0.45 0.00 - 0.85 K/uL    Eos (Absolute) 0.04 0.00 - 0.51 K/uL    Baso (Absolute) 0.04 0.00 - 0.12 K/uL    Immature Granulocytes (abs) 0.02 0.00 - 0.11 K/uL    NRBC (Absolute) 0.00 K/uL   COMP METABOLIC PANEL   Result Value Ref Range    Sodium 137 135 - 145 mmol/L    Potassium 3.6 3.6 - 5.5 mmol/L    Chloride 104 96 - 112 mmol/L    Co2 20 20 - 33 mmol/L    Anion Gap 13.0 7.0 - 16.0    Glucose 97 65 - 99 mg/dL    Bun 8 8 - 22 mg/dL    Creatinine 0.40 (L) 0.50 - 1.40 mg/dL    Calcium 9.3 8.5 - 10.5 mg/dL    Correct Calcium 9.1 8.5 - 10.5 mg/dL    AST(SGOT) 16 12 - 45 U/L    ALT(SGPT) 18 2 - 50 U/L    Alkaline Phosphatase 60 30 - 99 U/L    Total Bilirubin 0.5 0.1 - 1.5 mg/dL    Albumin 4.3 3.2 - 4.9 g/dL    Total Protein 7.7 6.0 - 8.2 g/dL    Globulin 3.4 1.9 - 3.5 g/dL    A-G Ratio 1.3 g/dL   LIPASE   Result Value Ref Range    Lipase 18 11 - 82 U/L   HCG QUANTITATIVE   Result Value Ref Range     [facility] [place] [address].0 (H) 0.0 - 5.0 mIU/mL   URINALYSIS,CULTURE IF INDICATED    Specimen: Urine, Clean Catch   Result Value Ref Range    Color Yellow     Character Clear     Specific Gravity 1.007 <1.035    Ph 6.5 5.0 - 8.0    Glucose Negative Negative mg/dL    Ketones Trace (A) Negative mg/dL    Protein Negative Negative mg/dL    Bilirubin Negative Negative    Urobilinogen, Urine 0.2 Negative    Nitrite Negative Negative    Leukocyte Esterase Negative Negative    Occult Blood Trace (A) Negative    Micro Urine Req Microscopic    RH Type for Rhogam from E.D.   Result Value Ref Range    Emergency Department Rh Typing POS     Number Of Rh Doses Indicated ZERO    ESTIMATED GFR   Result Value Ref Range    GFR (CKD-EPI) 137 >60 mL/min/1.73 m 2   URINE MICROSCOPIC (W/UA)   Result Value Ref Range    WBC 0-2 /hpf    RBC 0-2 /hpf    Bacteria Many (A) None /hpf    Epithelial Cells Many (A) /hpf    Hyaline Cast 0-2 /lpf         RADIOLOGY  I have independently interpreted the diagnostic imaging associated with this visit and am waiting the final reading from the radiologist.   My preliminary interpretation is as follows: Ultrasound does show an IUP    Radiologist interpretation:  US-OB 1ST TRIMESTER WITH TRANSVAGINAL (COMBO)   Final Result      1.  Single intrauterine pregnancy at 6 weeks, 4 days estimated gestational age, with an ERIN of 1/10/2025.   2.  Fetal heart rate in the region of the fetal pole is 58 bpm, likely the maternal fetal heart rate. Close interval imaging follow-up is recommended.   3.  No perigestational fluid collection.   4.  Pelvic congestion syndrome.   5.  Obscuration of the left ovary.              COURSE & MEDICAL DECISION MAKING    ED COURSE:    ED Observation Status? No, the patient does not qualify for observation    INTERVENTIONS BY ME:  Medications - No data to display      Rechecks patient has no significant changes to her symptomatology.      INITIAL ASSESSMENT, COURSE AND PLAN  Care Narrative: Patient  presents for evaluation.  Clinically the patient has no significant physical findings.  Clinically ultrasound was obtained there is an IUP.  Urinalysis shows trace ketones trace blood with many epithelial cells 0-2 white blood cells 0-2 red blood cells.  At this point I do not feel she has a urinary tract infection.  Laboratory studies are unconcerning.  The patient is an Rh+ patient.  Beta-hCG 46,751.  At this point I feel the patient can be discharged to follow-up with the pregnancy center.  I feel that her chronic abdominal pain could be from the pelvic congestion syndrome.  At this point I recommend Tylenol for pain control should follow-up with obstetrician for further outpatient treatment and care and return as needed.             ADDITIONAL PROBLEM LIST  None  DISPOSITION AND DISCUSSIONS  I have discussed management of the patient with the following physicians and MARCELA's: None    Escalation of care considered, and ultimately not performed: No need for escalation at this time concern was for ectopic pregnancy    Barriers to care at this time, including but not limited to: None.     Decision tools and prescription drugs considered including, but not limited to: Patient is stable for discharge.  Recommended OTC medications for pain control..    FINAL DIAGNOSIS  1. Lower abdominal pain    2. Less than 8 weeks gestation of pregnancy    3. Pelvic congestion syndrome           The patient will return for new or worsening symptoms and is stable at the time of discharge.    The patient is referred to a primary physician for blood pressure management, diabetic screening, and for all other preventative health concerns.    I reviewed prescription monitoring program for patient's narcotic use before prescribing a scheduled drug.The patient will not drink alcohol nor drive with prescribed medications        DISPOSITION:  Patient will be discharged home in stable condition.    FOLLOW UP:  [facility]  [place] [id number] [address] [address]  [phone]  Schedule an appointment as soon as possible for a visit         OUTPATIENT MEDICATIONS:  New Prescriptions    No medications on file                    Electronically signed by: Papito Carter M.D.,1:51 PM 05/21/24

## 2024-05-24 ENCOUNTER — TELEPHONE (OUTPATIENT)
Dept: URGENT CARE | Facility: PHYSICIAN GROUP | Age: 29
End: 2024-05-24
Payer: COMMERCIAL

## 2024-05-24 DIAGNOSIS — O23.11 ACUTE CYSTITIS DURING PREGNANCY IN FIRST TRIMESTER: ICD-10-CM

## 2024-05-24 LAB
BACTERIA UR CULT: ABNORMAL
BACTERIA UR CULT: ABNORMAL
SIGNIFICANT IND 70042: ABNORMAL
SITE SITE: ABNORMAL
SOURCE SOURCE: ABNORMAL

## 2024-05-24 RX ORDER — CEPHALEXIN 500 MG/1
500 CAPSULE ORAL 4 TIMES DAILY
Qty: 20 CAPSULE | Refills: 0 | Status: SHIPPED | OUTPATIENT
Start: 2024-05-24 | End: 2024-05-29

## 2024-07-03 ENCOUNTER — TELEPHONE (OUTPATIENT)
Dept: OBGYN | Facility: CLINIC | Age: 29
End: 2024-07-03
Payer: COMMERCIAL

## 2025-03-19 ENCOUNTER — HOSPITAL ENCOUNTER (EMERGENCY)
Facility: MEDICAL CENTER | Age: 30
End: 2025-03-20
Attending: STUDENT IN AN ORGANIZED HEALTH CARE EDUCATION/TRAINING PROGRAM
Payer: COMMERCIAL

## 2025-03-19 DIAGNOSIS — R10.9 ABDOMINAL CRAMPING: ICD-10-CM

## 2025-03-19 DIAGNOSIS — N39.0 ACUTE UTI: ICD-10-CM

## 2025-03-19 LAB
ALBUMIN SERPL BCP-MCNC: 4 G/DL (ref 3.2–4.9)
ALBUMIN/GLOB SERPL: 1.2 G/DL
ALP SERPL-CCNC: 70 U/L (ref 30–99)
ALT SERPL-CCNC: 16 U/L (ref 2–50)
ANION GAP SERPL CALC-SCNC: 11 MMOL/L (ref 7–16)
APPEARANCE UR: CLEAR
AST SERPL-CCNC: 19 U/L (ref 12–45)
BACTERIA #/AREA URNS HPF: ABNORMAL /HPF
BASOPHILS # BLD AUTO: 0.5 % (ref 0–1.8)
BASOPHILS # BLD: 0.04 K/UL (ref 0–0.12)
BILIRUB SERPL-MCNC: <0.2 MG/DL (ref 0.1–1.5)
BILIRUB UR QL STRIP.AUTO: NEGATIVE
BUN SERPL-MCNC: 13 MG/DL (ref 8–22)
CALCIUM ALBUM COR SERPL-MCNC: 9.4 MG/DL (ref 8.5–10.5)
CALCIUM SERPL-MCNC: 9.4 MG/DL (ref 8.5–10.5)
CASTS URNS QL MICRO: ABNORMAL /LPF (ref 0–2)
CHLORIDE SERPL-SCNC: 103 MMOL/L (ref 96–112)
CO2 SERPL-SCNC: 20 MMOL/L (ref 20–33)
COLOR UR: YELLOW
CREAT SERPL-MCNC: 0.55 MG/DL (ref 0.5–1.4)
EOSINOPHIL # BLD AUTO: 0.04 K/UL (ref 0–0.51)
EOSINOPHIL NFR BLD: 0.5 % (ref 0–6.9)
EPITHELIAL CELLS 1715: ABNORMAL /HPF (ref 0–5)
ERYTHROCYTE [DISTWIDTH] IN BLOOD BY AUTOMATED COUNT: 36.8 FL (ref 35.9–50)
GFR SERPLBLD CREATININE-BSD FMLA CKD-EPI: 126 ML/MIN/1.73 M 2
GLOBULIN SER CALC-MCNC: 3.3 G/DL (ref 1.9–3.5)
GLUCOSE SERPL-MCNC: 95 MG/DL (ref 65–99)
GLUCOSE UR STRIP.AUTO-MCNC: NEGATIVE MG/DL
HCT VFR BLD AUTO: 34.3 % (ref 37–47)
HGB BLD-MCNC: 12 G/DL (ref 12–16)
IMM GRANULOCYTES # BLD AUTO: 0.02 K/UL (ref 0–0.11)
IMM GRANULOCYTES NFR BLD AUTO: 0.3 % (ref 0–0.9)
KETONES UR STRIP.AUTO-MCNC: NEGATIVE MG/DL
LEUKOCYTE ESTERASE UR QL STRIP.AUTO: ABNORMAL
LIPASE SERPL-CCNC: 23 U/L (ref 11–82)
LYMPHOCYTES # BLD AUTO: 1.95 K/UL (ref 1–4.8)
LYMPHOCYTES NFR BLD: 24.4 % (ref 22–41)
MCH RBC QN AUTO: 29.6 PG (ref 27–33)
MCHC RBC AUTO-ENTMCNC: 35 G/DL (ref 32.2–35.5)
MCV RBC AUTO: 84.5 FL (ref 81.4–97.8)
MICRO URNS: ABNORMAL
MONOCYTES # BLD AUTO: 0.46 K/UL (ref 0–0.85)
MONOCYTES NFR BLD AUTO: 5.8 % (ref 0–13.4)
NEUTROPHILS # BLD AUTO: 5.47 K/UL (ref 1.82–7.42)
NEUTROPHILS NFR BLD: 68.5 % (ref 44–72)
NITRITE UR QL STRIP.AUTO: NEGATIVE
NRBC # BLD AUTO: 0 K/UL
NRBC BLD-RTO: 0 /100 WBC (ref 0–0.2)
NUMBER OF RH DOSES IND 8505RD: NORMAL
PH UR STRIP.AUTO: 6 [PH] (ref 5–8)
PLATELET # BLD AUTO: 278 K/UL (ref 164–446)
PMV BLD AUTO: 10.5 FL (ref 9–12.9)
POTASSIUM SERPL-SCNC: 3.3 MMOL/L (ref 3.6–5.5)
PROT SERPL-MCNC: 7.3 G/DL (ref 6–8.2)
PROT UR QL STRIP: NEGATIVE MG/DL
RBC # BLD AUTO: 4.06 M/UL (ref 4.2–5.4)
RBC # URNS HPF: ABNORMAL /HPF (ref 0–2)
RBC UR QL AUTO: ABNORMAL
RH BLD: NORMAL
SODIUM SERPL-SCNC: 134 MMOL/L (ref 135–145)
SP GR UR STRIP.AUTO: 1.02
UROBILINOGEN UR STRIP.AUTO-MCNC: 1 EU/DL
WBC # BLD AUTO: 8 K/UL (ref 4.8–10.8)
WBC #/AREA URNS HPF: ABNORMAL /HPF

## 2025-03-19 PROCEDURE — 85025 COMPLETE CBC W/AUTO DIFF WBC: CPT

## 2025-03-19 PROCEDURE — 81001 URINALYSIS AUTO W/SCOPE: CPT

## 2025-03-19 PROCEDURE — 99284 EMERGENCY DEPT VISIT MOD MDM: CPT

## 2025-03-19 PROCEDURE — 84702 CHORIONIC GONADOTROPIN TEST: CPT

## 2025-03-19 PROCEDURE — 87086 URINE CULTURE/COLONY COUNT: CPT

## 2025-03-19 PROCEDURE — 83690 ASSAY OF LIPASE: CPT

## 2025-03-19 PROCEDURE — 36415 COLL VENOUS BLD VENIPUNCTURE: CPT

## 2025-03-19 PROCEDURE — 87077 CULTURE AEROBIC IDENTIFY: CPT | Mod: 91

## 2025-03-19 PROCEDURE — 80053 COMPREHEN METABOLIC PANEL: CPT

## 2025-03-19 PROCEDURE — 86901 BLOOD TYPING SEROLOGIC RH(D): CPT

## 2025-03-19 RX ORDER — CEPHALEXIN 500 MG/1
500 CAPSULE ORAL ONCE
Status: COMPLETED | OUTPATIENT
Start: 2025-03-20 | End: 2025-03-20

## 2025-03-19 RX ORDER — CEPHALEXIN 500 MG/1
500 CAPSULE ORAL 2 TIMES DAILY
Qty: 10 CAPSULE | Refills: 0 | Status: ACTIVE | OUTPATIENT
Start: 2025-03-19 | End: 2025-03-24

## 2025-03-19 ASSESSMENT — PAIN DESCRIPTION - PAIN TYPE
TYPE: ACUTE PAIN
TYPE: ACUTE PAIN

## 2025-03-19 ASSESSMENT — FIBROSIS 4 INDEX: FIB4 SCORE: 0.39

## 2025-03-19 NOTE — Clinical Note
Jennifer Cueto was seen and treated in our emergency department on 3/19/2025.  She may return to work on 03/21/2025.       If you have any questions or concerns, please don't hesitate to call.      Del Chilel D.O.

## 2025-03-20 ENCOUNTER — APPOINTMENT (OUTPATIENT)
Dept: RADIOLOGY | Facility: MEDICAL CENTER | Age: 30
End: 2025-03-20
Attending: STUDENT IN AN ORGANIZED HEALTH CARE EDUCATION/TRAINING PROGRAM
Payer: COMMERCIAL

## 2025-03-20 VITALS
RESPIRATION RATE: 15 BRPM | SYSTOLIC BLOOD PRESSURE: 99 MMHG | TEMPERATURE: 96.2 F | DIASTOLIC BLOOD PRESSURE: 58 MMHG | WEIGHT: 135.58 LBS | HEIGHT: 60 IN | BODY MASS INDEX: 26.62 KG/M2 | OXYGEN SATURATION: 98 % | HEART RATE: 58 BPM

## 2025-03-20 LAB — B-HCG SERPL-ACNC: ABNORMAL MIU/ML (ref 0–5)

## 2025-03-20 PROCEDURE — 700102 HCHG RX REV CODE 250 W/ 637 OVERRIDE(OP): Performed by: STUDENT IN AN ORGANIZED HEALTH CARE EDUCATION/TRAINING PROGRAM

## 2025-03-20 PROCEDURE — A9270 NON-COVERED ITEM OR SERVICE: HCPCS | Performed by: STUDENT IN AN ORGANIZED HEALTH CARE EDUCATION/TRAINING PROGRAM

## 2025-03-20 PROCEDURE — 76801 OB US < 14 WKS SINGLE FETUS: CPT

## 2025-03-20 RX ADMIN — CEPHALEXIN 500 MG: 500 CAPSULE ORAL at 00:08

## 2025-03-20 NOTE — ED TRIAGE NOTES
Chief Complaint   Patient presents with    Abdominal Pain     Pt reports since last night she's been having lower abdominal pain. Pt is pregnant but don't know exactly how many weeks will see an ObGyne this April. LMP 2nd week of January.  Denies vaginal bleeding or any discharges.     Pregnancy     Pt had hx of miscarriage last year that's why opted to come to ED to make if sure everything is ok.      /60   Pulse 61   Temp (!) 35.7 °C (96.2 °F) (Temporal)   Resp 16   Ht 1.524 m (5')   Wt 61.5 kg (135 lb 9.3 oz)   LMP 01/08/2025 (Approximate)   SpO2 100%   BMI 26.48 kg/m²     Pain:6  /10  Ambulatory:  Yes  Alert and Oriented: x 4  Oxygen Treatment: No    Pt came in to triage for the above complaints.   Pt is speaking in full sentences, follows commands and responds appropriately to questions.   Respirations are even and unlabored.  Pt placed in phlebo area. Pt educated on triage process.     Pt encouraged to inform staff for any changes in condition or if needs help while waiting to be room in.

## 2025-03-20 NOTE — ED NOTES
Pt given DC instructions and verbalized understanding. Pt is A&O and ambulatory out of ER with spouse

## 2025-03-20 NOTE — ED NOTES
Rounded on patient.    Alert and oriented. Vital signs stable. No indicators of pain.  Patient resting comfortably on gurney in room.  Patient medicated per MAR  Respirations even and unlabored.  Patient endorses no changes and denies any needs at this time.

## 2025-03-20 NOTE — ED NOTES
Rounded on patient.  Alert and oriented. Vital signs stable. No indicators of pain.  Patient resting comfortably on gurney in room.  Respirations even and unlabored.  Patient endorses no changes and denies any needs at this time.

## 2025-03-20 NOTE — DISCHARGE INSTRUCTIONS
If you develop any severe right lower quadrant abdominal pain, fevers, uncontrolled vomiting return to the ER.

## 2025-03-20 NOTE — ED PROVIDER NOTES
CHIEF COMPLAINT  Chief Complaint   Patient presents with    Abdominal Pain     Pt reports since last night she's been having lower abdominal pain. Pt is pregnant but don't know exactly how many weeks will see an ObGyne this April. LMP 2nd week of January.  Denies vaginal bleeding or any discharges.     Pregnancy     Pt had hx of miscarriage last year that's why opted to come to ED to make if sure everything is ok.        LIMITATION TO HISTORY   Select: None    HPI    Jennifer Cueto is a 29 y.o. female who for evaluation of lower abdominal cramping which is been ongoing since last night.  No vaginal bleeding vaginal discharge increasing or frequency urgency dysuria no nausea vomiting or diarrhea.  Patient is  presents to the Emergency Department .  Last menstrual cycle was the second week of January.  Did have a positive at-home pregnancy test though has not had an ultrasound.  Is pending appointment with OB     OUTSIDE HISTORIAN(S):  Select: none    EXTERNAL RECORDS REVIEWED  Select: Other [facility] ED visit seen for vaginal bleeding,      PAST MEDICAL HISTORY  History reviewed. No pertinent past medical history.  .    SURGICAL HISTORY  History reviewed. No pertinent surgical history.      FAMILY HISTORY  Family History   Problem Relation Age of Onset    Diabetes Paternal Grandmother     Diabetes Paternal Grandfather           SOCIAL HISTORY  Social History     Socioeconomic History    Marital status:      Spouse name: Not on file    Number of children: Not on file    Years of education: Not on file    Highest education level: Not on file   Occupational History    Not on file   Tobacco Use    Smoking status: Never    Smokeless tobacco: Never   Vaping Use    Vaping status: Never Used   Substance and Sexual Activity    Alcohol use: Never    Drug use: Never    Sexual activity: Yes     Partners: Male     Comment: None    Other Topics Concern    Not on file   Social History Narrative    Not on file      Social Drivers of Health     Financial Resource Strain: Not on file   Food Insecurity: Not on file   Transportation Needs: Not on file   Physical Activity: Not on file   Stress: Not on file   Social Connections: Not on file   Intimate Partner Violence: Not on file   Housing Stability: Not on file         CURRENT MEDICATIONS  No current facility-administered medications on file prior to encounter.     Current Outpatient Medications on File Prior to Encounter   Medication Sig Dispense Refill    ibuprofen (MOTRIN) 800 MG Tab Take 1 Tablet by mouth every 6 hours as needed. 30 Tablet 0    acetaminophen (TYLENOL) 325 MG Tab Take 2 Tablets by mouth every four hours as needed. 30 Tablet 0           ALLERGIES  No Known Allergies    PHYSICAL EXAM  VITAL SIGNS:BP 99/58   Pulse (!) 58   Temp (!) 35.7 °C (96.2 °F) (Temporal)   Resp 15   Ht 1.524 m (5')   Wt 61.5 kg (135 lb 9.3 oz)   LMP 01/08/2025 (Approximate)   SpO2 98%   BMI 26.48 kg/m²       VITALS - vital signs documented prior to this note have been reviewed and noted,  GENERAL - awake, alert, oriented, GCS 15, no apparent distress, non-toxic  appearing  HEENT - normocephalic, atraumatic, pupils equal, sclera anicteric, mucus  membranes moist  NECK - supple, no meningismus, full active range of motion, trachea midline  CARDIOVASCULAR - regular rate/rhythm, no murmurs/gallops/rubs  PULMONARY - no respiratory distress, speaking in full sentences, clear to  auscultation bilaterally, no wheezing/ronchi/rales, no accessory muscle use  GASTROINTESTINAL - soft, non-tender, non-distended, no rebound, guarding,  or peritonitis  GENITOURINARY -patient declined pelvic exam  NEUROLOGIC - Awake alert, normal mental status, speech fluid, cognition  normal, moves all extremities  MUSCULOSKELETAL - no obvious asymmetry or deformities present  EXTREMITIES - warm, well-perfused, no cyanosis or significant edema  DERMATOLOGIC - warm, dry, no rashes, no jaundice  PSYCHIATRIC -  normal affect, normal insight, normal concentration    DIAGNOSTIC STUDIES / PROCEDURES      LABS  Labs Reviewed   CBC WITH DIFFERENTIAL - Abnormal; Notable for the following components:       Result Value    RBC 4.06 (*)     Hematocrit 34.3 (*)     All other components within normal limits   COMP METABOLIC PANEL - Abnormal; Notable for the following components:    Sodium 134 (*)     Potassium 3.3 (*)     All other components within normal limits   HCG QUANTITATIVE - Abnormal; Notable for the following components:    Bhcg 86786.0 (*)     All other components within normal limits   URINALYSIS,CULTURE IF INDICATED - Abnormal; Notable for the following components:    Leukocyte Esterase Trace (*)     Occult Blood Moderate (*)     All other components within normal limits   URINE MICROSCOPIC (W/UA) - Abnormal; Notable for the following components:    WBC 11-20 (*)     RBC 6-10 (*)     Bacteria Moderate (*)     All other components within normal limits   LIPASE   RH TYPE FOR RHOGAM FROM E.D.   ESTIMATED GFR   URINE CULTURE(NEW)       Urinalysis is consistent with urinary tract infection single live IUP  RADIOLOGY  I have independently interpreted the diagnostic imaging associated with this visit and am waiting the final reading from the radiologist.   My preliminary interpretation is as follows: Single live IUP      Radiologist interpretation:   US-OB 1ST TRIMESTER SINGLE GEST Is the patient pregnant? Yes   Final Result         1.  Single living intrauterine pregnancy at 6 weeks, 6 days estimated gestational age.   2.  Large right ovarian cyst without visualized complex features           COURSE & MEDICAL DECISION MAKING    ED COURSE:        INTERVENTIONS BY ME:  Medications   cephALEXin (Keflex) capsule 500 mg (500 mg Oral Given 3/20/25 0008)         INITIAL ASSESSMENT, COURSE AND PLAN  Care Narrative: Patient presented for evaluation of abdominal cramping which started last night but no vaginal bleeding vaginal  discharge.  No reproducible abdominal tenderness on examination.  Patient does have a recent at home positive pregnancy test though no ultrasound to confirm an intrauterine pregnancy.  Differential include was not limited to threatened  missed  inevitable  ectopic pregnancy intrauterine pregnancy, dehydration electrolyte abnormality urinary tract extremity many other considerations.  Workup was initiated.  Her Rh is positive no need for RhoGAM urinalysis is consistent with UTI, she has no fevers no leukocytosis or flank pain doubt pyelonephritis.  Will be treated with Keflex for a cystitis.  Her ultrasound did confirm a single live IUP.  Patient has no vaginal bleeding, declined pelvic exam thus this was deferred.  Her ultrasound did show a large right ovarian cyst without complex features though normal Doppler flow.  Will instruct her to follow-up with her OB gynecology, return with worsening severe right lower quadrant abdominal pain vaginal bleeding fevers or other concerns.  Will be discharged on Keflex.  Patient is discharged in stable condition.             ADDITIONAL PROBLEM LIST    DISPOSITION AND DISCUSSIONS      Barriers to care at this time, including but not limited to: Patient does not have established PCP.     Decision tools and prescription drugs considered including, but not limited to: Antibiotics for UTI .    FINAL DIAGNOSIS  1. Acute UTI    2. Abdominal cramping             Electronically signed by: Del Terry DO ,1:38 AM 25

## 2025-03-22 LAB
BACTERIA UR CULT: NORMAL
SIGNIFICANT IND 70042: NORMAL
SITE SITE: NORMAL
SOURCE SOURCE: NORMAL

## 2025-04-09 ENCOUNTER — HOSPITAL ENCOUNTER (OUTPATIENT)
Facility: MEDICAL CENTER | Age: 30
End: 2025-04-09
Attending: PHYSICIAN ASSISTANT
Payer: COMMERCIAL

## 2025-04-09 ENCOUNTER — INITIAL PRENATAL (OUTPATIENT)
Dept: OBGYN | Facility: CLINIC | Age: 30
End: 2025-04-09
Payer: COMMERCIAL

## 2025-04-09 VITALS — WEIGHT: 132.2 LBS | BODY MASS INDEX: 25.82 KG/M2 | DIASTOLIC BLOOD PRESSURE: 55 MMHG | SYSTOLIC BLOOD PRESSURE: 90 MMHG

## 2025-04-09 DIAGNOSIS — Z34.81 ENCOUNTER FOR SUPERVISION OF OTHER NORMAL PREGNANCY, FIRST TRIMESTER: ICD-10-CM

## 2025-04-09 PROBLEM — O23.42 URINARY TRACT INFECTION IN MOTHER DURING SECOND TRIMESTER OF PREGNANCY: Status: RESOLVED | Noted: 2021-05-13 | Resolved: 2025-04-09

## 2025-04-09 PROBLEM — O28.3 ABNORMAL PREGNANCY US: Status: RESOLVED | Noted: 2021-05-27 | Resolved: 2025-04-09

## 2025-04-09 PROBLEM — Z34.83 ENCOUNTER FOR SUPERVISION OF OTHER NORMAL PREGNANCY, THIRD TRIMESTER: Status: RESOLVED | Noted: 2021-05-13 | Resolved: 2025-04-09

## 2025-04-09 PROCEDURE — 87491 CHLMYD TRACH DNA AMP PROBE: CPT

## 2025-04-09 PROCEDURE — 87591 N.GONORRHOEAE DNA AMP PROB: CPT

## 2025-04-09 PROCEDURE — 0501F PRENATAL FLOW SHEET: CPT | Performed by: PHYSICIAN ASSISTANT

## 2025-04-09 PROCEDURE — 3078F DIAST BP <80 MM HG: CPT | Performed by: PHYSICIAN ASSISTANT

## 2025-04-09 PROCEDURE — 87624 HPV HI-RISK TYP POOLED RSLT: CPT

## 2025-04-09 PROCEDURE — 3074F SYST BP LT 130 MM HG: CPT | Performed by: PHYSICIAN ASSISTANT

## 2025-04-09 PROCEDURE — 88142 CYTOPATH C/V THIN LAYER: CPT

## 2025-04-09 RX ORDER — PRENATAL VIT/IRON FUM/FOLIC AC 27MG-0.8MG
1 TABLET ORAL DAILY
Qty: 30 TABLET | Refills: 5 | Status: SHIPPED | OUTPATIENT
Start: 2025-04-09

## 2025-04-09 ASSESSMENT — EDINBURGH POSTNATAL DEPRESSION SCALE (EPDS)
I HAVE BEEN SO UNHAPPY THAT I HAVE HAD DIFFICULTY SLEEPING: NOT AT ALL
I HAVE BEEN SO UNHAPPY THAT I HAVE BEEN CRYING: NO, NEVER
I HAVE BEEN ANXIOUS OR WORRIED FOR NO GOOD REASON: NO, NOT AT ALL
THINGS HAVE BEEN GETTING ON TOP OF ME: YES, SOMETIMES I HAVEN'T BEEN COPING AS WELL AS USUAL
I HAVE BLAMED MYSELF UNNECESSARILY WHEN THINGS WENT WRONG: NO, NEVER
I HAVE BEEN ABLE TO LAUGH AND SEE THE FUNNY SIDE OF THINGS: AS MUCH AS I ALWAYS COULD
TOTAL SCORE: 2
THE THOUGHT OF HARMING MYSELF HAS OCCURRED TO ME: NEVER
I HAVE FELT SCARED OR PANICKY FOR NO GOOD REASON: NO, NOT AT ALL
I HAVE FELT SAD OR MISERABLE: NO, NOT AT ALL
I HAVE LOOKED FORWARD WITH ENJOYMENT TO THINGS: AS MUCH AS I EVER DID

## 2025-04-09 ASSESSMENT — FIBROSIS 4 INDEX: FIB4 SCORE: 0.51

## 2025-04-09 NOTE — PROGRESS NOTES
Establish Pregnancy Visit    CC: First OB Visit    HPI: Patient is a 30 y.o.  at 9w5d who presents for her first OB visit.    Patient's last menstrual period was 01/10/2025 (within days). Pt had an US in the ED 3/20/25 giving her an Estimated Date of Delivery: 25 which is about  3 weeks off ERIN per LMP. Menses are typically regular q 28 days. Was tracking cycles.     Patient is not feeling fetal movement.    Patient reports vaginal bleeding, reports nausea, reports vomiting.   Pt denies headaches, vaginal discharge, or urinary symptoms.      Pt reports no complaints at this time.   ER visits or previous care in this pregnancy: due to ABD pain in pregnancy, prior miscarriage     FOB is involved   Pregnancy is planned, but is desired.    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     Pre-eclampsia Risk Factors:   HIGH RISK FACTOR: None   MODERATE RISK FACTORS: None     Gestational Diabetes Risk Factors:   BMI >25 and Family history of diabetes in first degree relative , and High risk ethnicity (, , ,  American, )      GYN HX:   Last Pap:  NILM  Hx Moderate or Severe Dysplasia : no  Hx STD : yes - chlamydia in prior pregnancy with negative PATRICIA    OBSTETRIC HISTORY:  OB History    Para Term  AB Living   7 5 5   5   SAB IAB Ectopic Molar Multiple Live Births       0 5      # Outcome Date GA Lbr Amadou/2nd Weight Sex Type Anes PTL Lv   7 Current            6 Term 21 40w0d  7 lb 5.5 oz M Vag-Spont None N MARTINA   5 Term 10/18/19 38w3d / 00:04 6 lb 15.5 oz M Vag-Spont None N MARTINA   4 Term 18 40w0d  6 lb F Vag-Spont  N MARTINA   3 Term 04/15/17 40w0d  7 lb M Vag-Spont  N MARTINA   2 Term 08/08/15 40w0d  6 lb F Vag-Spont  N MARTINA   1                 Prior pregnancy complications: none .   Prior caesarian deliveries: none.    No history of preeclampsia, gestational diabetes,  macrosomia, fetal growth restriction or low birthweight baby, or genetic abnormalities.   No history of  deliveries, stillborn, or multiples.     MEDICAL HISTORY:  History reviewed. No pertinent past medical history.    Significant medical history: none .     Denies history of asthma, thyroid disease, HSV, PCOS, diabetes or glucose impairment, renal disease, hypertension, hyperlipidemia, autoimmune diseases (lupus or antiphospholipid antibody syndrome), sleep apnea. No history of transfusions.     Psych History   Depression/anxiety:  denies  Bipolar    denies  Postpartum Mood Changes denies    MEDICATIONS:  No current outpatient medications on file prior to visit.     No current facility-administered medications on file prior to visit.       Current Medications:   Patient is not taking prenatal vitamin.  Significant medications: none.     FAMILY HISTORY:  Family History   Problem Relation Age of Onset    Diabetes Paternal Grandmother     Diabetes Paternal Grandfather      PGF and PGM with T2DM    Denies Family history of genetic disorders (cystic fibrosis, SMA, Fragile X, Jonny-sachs), hemoglobinopathies (thalassemia, Sickle cell), preeclampsia, birth defects, developmental delays, diabetes, cardiovascular disease, cancers.     SURGICAL HISTORY:  History reviewed. No pertinent surgical history.    Denies history of pelvic or bariatric surgery     ALLERGIES / REACTIONS:  No Known Allergies             SOCIAL HISTORY:   reports that she has never smoked. She has never used smokeless tobacco. She reports that she does not drink alcohol and does not use drugs.    Denies current or hx of sexual, emotional or physical abuse or trauma.   Trauma may prevent vaginal exam or vaginal birth: n/a    Tobacco use: denies  Alcohol use: denies  Marijuana use: denies  Drug use: denies    ROS:   Gen: no fevers or chills, no significant weight loss or gain, excessive fatigue  Respiratory:  no cough or dyspnea  Cardiac:  no chest  pain, no palpitations, no syncope  Breast: no breast discharge, pain, lump or skin changes  GI:  no heartburn, no abdominal pain, no nausea or vomiting  Urinary: no dysuria, urgency, frequency, incontinence   Psych: no depression or anxiety  Neuro: no migraines with aura, fainting spells, numbness or tingling  Extremities: no joint pain, persistently swollen ankles, recurrent leg cramps         PHYSICAL EXAMINATION:  Vital Signs: BP 90/55   Wt 132 lb 3.2 oz   LMP 01/10/2025 (Within Days)   BMI 25.82 kg/m²    Constitutional: The patient is well developed and well nourished.  Psychiatric: Patient is oriented to time place and person.   Skin: No rash observed.  Neck: Neck appears symmetric. There are no masses or adenopathy present.  Respiratory: normal effort  Abdomen: Soft, non-tender.  Pelvic:    Vulva: normal.    Urethra: normal.   Vagina: normal.    Cervix: normal.    Uterus: consistent with dates    Adnexa: normal.   Perineum: normal.   GC / Chlamydia cultures obtained.   Pap Smear Obtained: yes   Chaperone present: JULIET Thakur  Extremeties: Legs are symmetric and without tenderness. There is no edema present.    EPDS Score: 4    ACOG SCREENING  Infection Prevention  1. High Risk For HIV: No 6. Rash Or Illness Since LMP: No     2. High Risk For Hepatitis B or C: No 7. History Of STD, GC, Chlamydia, HPV Syphilis: No     3. Live With Someone With TB Or Exposed To TB: No 8. Have a cat in the home?: No     4. Patient Or Partner Has A History Of Herpes: No      5. History of Chicken Pox: No 9. Other (See Comments Below): No   Comments: MOB working full time and FOB working full time.         Genetic Screening/Teratology Counseling- Includes patient, baby's father, or anyone in either family with:  Patient's age 35 years or older as of estimated date of delivery: No     Thalassemia (Italian, Greek, Mediterranean, or  background): MCV less than 80: No     Neural tube defect (Meningomyelocele, Spina bifida, or  Anencephaly): No     Congenital heart defect: No     Down syndrome: No     Jonny-Sachs (Ashkenazi Congregation, Cajun, Algerian Ecuadorean): No     Canavan disease (Ashkenazi Congregation): No     Familial dysautonomia (Ashkenazi Congregation): No     Sickle cell disease or trait (): No     Hemophilia or other blood disorders: No     Muscular dystrophy: No    Cystic fibrosis: No     Cheshire's chorea: No     Mental retardation/autism: No     Other inherited genetic or chromosomal disorder: No     Maternal metabolic disorder (eg. Type 1 diabetes, PKU): No     Patient or baby's father had child with birth defects not listed above: No     Recurrent pregnancy loss, or a stillbirth: No     Medications (including supplements, vitamins, herbs, or OTC drugs)/illicit/recreational drugs/alcohol since last menstrual period: No                 ASSESSMENT AND PLAN:  30 y.o.  at 9w5d      # General Prenatal Care    - Labs  - Pap collected, STI testing collected with Pap, Vag path not indicated   - Routine Labs ordered:  PNL with Hepatitis Triple screening. No UDS infdicated  - Genetics: Discussed options for genetic/aneuploidy testing and information given for pt to consider.  Advised to call insurance for cost of testing.             -  Ordered Aneuploidy testing - NIPT to be done after 10-12 weeks     -  Ordered CF/SMA testing (pt does not recall doing this testing in a prior pregnancy)  - US  - Dating pregnancy US ordered as US in ED was 3 weeks off from LMP T 6W   - Pregnancy risk factor counseling   - Start ASA after 12 weeks: not indicated   - Early 1 hour GTT ordered: yes due to BMI and FHX DM  - Baseline PIH labs: not indicated   - Hemoglobin electrophoresis ordered: to be discussed with physician at follow up if indicated   - BMI: 25-29.9 Overweight   - Planned delivery method: vaginal  - MFM referral: Not indicated   - Weight gain goal: BMI 25.0-29.9: 15-25 lbs      # Counseling    - PNV: Recommended continuing or starting PNV  (pill form) if not already taking  - Diet: Increase water intake and encouraged healthy nutrition, reviewed recommended changes to diet in pregnancy.  - Vaccines: Discussed recommendation and timing for flu, Covid, RSV, TDaP vaccine during pregnancy.   - Exercise: Reviewed that moderate exercise into the 3rd trimester is associated with improved outcomes. Any contact sports, extreme activities, or that requires more than moderate exertion should be discussed before undertaking.   - Office policies: Discussed office policies and group practice model, prenatal care timeline, weight gain, diet and activity. Pregnancy packet provided.   - Reviewed indications to seek emergency care:including heavy bleeding, LOF, of severe abdominal pain.      Return in 4 weeks for New OB consult with physician       Dora Pinto P.A.-C.  [facility]     I reviewed the case with Mo Blas MD who consulted and agrees with the plan.

## 2025-04-09 NOTE — PROGRESS NOTES
Pt here for NOB apt.   Last pap : unk  LMP = 01/10/2025  ERIN = 11/07/2025  US : 03/19/2025  GA today = 9w5d  EPDS = 4  Pt is interested in genetic testing.   Pt states no concerns   Phone/Pharm verified.

## 2025-04-10 LAB
C TRACH DNA GENITAL QL NAA+PROBE: NEGATIVE
N GONORRHOEA DNA GENITAL QL NAA+PROBE: NEGATIVE
SPECIMEN SOURCE: NORMAL

## 2025-04-18 LAB
HPV I/H RISK 1 DNA SPEC QL NAA+PROBE: NOT DETECTED
SPECIMEN SOURCE: NORMAL
THINPREP PAP, CYTOLOGY NL11781: NORMAL

## 2025-04-22 ENCOUNTER — RESULTS FOLLOW-UP (OUTPATIENT)
Dept: OBGYN | Facility: CLINIC | Age: 30
End: 2025-04-22

## 2025-05-07 ENCOUNTER — ROUTINE PRENATAL (OUTPATIENT)
Dept: OBGYN | Facility: CLINIC | Age: 30
End: 2025-05-07
Payer: COMMERCIAL

## 2025-05-07 VITALS — DIASTOLIC BLOOD PRESSURE: 51 MMHG | SYSTOLIC BLOOD PRESSURE: 95 MMHG | BODY MASS INDEX: 25.39 KG/M2 | WEIGHT: 130 LBS

## 2025-05-07 DIAGNOSIS — Z34.82 PRENATAL CARE, SUBSEQUENT PREGNANCY IN SECOND TRIMESTER: ICD-10-CM

## 2025-05-07 PROCEDURE — 0502F SUBSEQUENT PRENATAL CARE: CPT | Performed by: OBSTETRICS & GYNECOLOGY

## 2025-05-07 PROCEDURE — 3074F SYST BP LT 130 MM HG: CPT | Performed by: OBSTETRICS & GYNECOLOGY

## 2025-05-07 PROCEDURE — 3078F DIAST BP <80 MM HG: CPT | Performed by: OBSTETRICS & GYNECOLOGY

## 2025-05-07 ASSESSMENT — FIBROSIS 4 INDEX: FIB4 SCORE: 0.51

## 2025-05-07 NOTE — NON-PROVIDER
Pt here today for OB follow up  Pt states no concerns  Labs + nips not done yet   Reports +FM  Good # [phone]   Pharmacy Confirmed.  Chaperone offered and provided.

## 2025-05-07 NOTE — PROGRESS NOTES
OB Followup;    13w5d    Patient Active Problem List    Diagnosis Date Noted    Encounter for supervision of other normal pregnancy, first trimester 2025       Vitals:    25 1241   BP: 95/51   Weight: 130 lb       Patient presents for followup of OB care. Currently doing well . Good fetal movement no leakage of fluid no contractions or vaginal bleeding        Size equals dates, normal fetal heart rate      Labs; patient instructed to perform prenatal labs today she has not performed them as yet    Labor precautions given  Discussed proper weight gain during pregnancy.    Signs and symptoms of labor/ labor discussed   Discussed proper exercise during pregnancy  Discussed proper oral fluid hydration  Reviewed fetal kick counts and appropriate fetal movement during pregnancy  Reviewed postpartum birth control methods  All questions answered in detail    Followup in  4 weeks

## 2025-06-03 ENCOUNTER — HOSPITAL ENCOUNTER (OUTPATIENT)
Dept: LAB | Facility: MEDICAL CENTER | Age: 30
End: 2025-06-03
Attending: PHYSICIAN ASSISTANT
Payer: COMMERCIAL

## 2025-06-03 ENCOUNTER — ROUTINE PRENATAL (OUTPATIENT)
Dept: OBGYN | Facility: CLINIC | Age: 30
End: 2025-06-03
Payer: COMMERCIAL

## 2025-06-03 VITALS — BODY MASS INDEX: 25.39 KG/M2 | DIASTOLIC BLOOD PRESSURE: 57 MMHG | SYSTOLIC BLOOD PRESSURE: 90 MMHG | WEIGHT: 130 LBS

## 2025-06-03 DIAGNOSIS — Z34.81 ENCOUNTER FOR SUPERVISION OF OTHER NORMAL PREGNANCY, FIRST TRIMESTER: ICD-10-CM

## 2025-06-03 DIAGNOSIS — Z34.81 ENCOUNTER FOR SUPERVISION OF OTHER NORMAL PREGNANCY, FIRST TRIMESTER: Primary | ICD-10-CM

## 2025-06-03 LAB
ABO GROUP BLD: NORMAL
BLD GP AB SCN SERPL QL: NORMAL
ERYTHROCYTE [DISTWIDTH] IN BLOOD BY AUTOMATED COUNT: 42.5 FL (ref 35.9–50)
EST. AVERAGE GLUCOSE BLD GHB EST-MCNC: 103 MG/DL
HBA1C MFR BLD: 5.2 % (ref 4–5.6)
HBV CORE AB SERPL QL IA: NONREACTIVE
HBV SURFACE AB SERPL IA-ACNC: <3.5 MIU/ML (ref 0–10)
HBV SURFACE AG SER QL: NORMAL
HCT VFR BLD AUTO: 30.9 % (ref 37–47)
HCV AB SER QL: NORMAL
HGB BLD-MCNC: 10.5 G/DL (ref 12–16)
HIV 1+2 AB+HIV1 P24 AG SERPL QL IA: NORMAL
MCH RBC QN AUTO: 30.6 PG (ref 27–33)
MCHC RBC AUTO-ENTMCNC: 34 G/DL (ref 32.2–35.5)
MCV RBC AUTO: 90.1 FL (ref 81.4–97.8)
PLATELET # BLD AUTO: 266 K/UL (ref 164–446)
PMV BLD AUTO: 10.8 FL (ref 9–12.9)
RBC # BLD AUTO: 3.43 M/UL (ref 4.2–5.4)
RH BLD: NORMAL
RUBV AB SER QL: 5.94 IU/ML
T PALLIDUM AB SER QL IA: NORMAL
WBC # BLD AUTO: 7.1 K/UL (ref 4.8–10.8)

## 2025-06-03 PROCEDURE — 87389 HIV-1 AG W/HIV-1&-2 AB AG IA: CPT

## 2025-06-03 PROCEDURE — 86803 HEPATITIS C AB TEST: CPT

## 2025-06-03 PROCEDURE — 36415 COLL VENOUS BLD VENIPUNCTURE: CPT

## 2025-06-03 PROCEDURE — 3074F SYST BP LT 130 MM HG: CPT | Performed by: PHYSICIAN ASSISTANT

## 2025-06-03 PROCEDURE — 0502F SUBSEQUENT PRENATAL CARE: CPT | Performed by: PHYSICIAN ASSISTANT

## 2025-06-03 PROCEDURE — 86780 TREPONEMA PALLIDUM: CPT

## 2025-06-03 PROCEDURE — 3078F DIAST BP <80 MM HG: CPT | Performed by: PHYSICIAN ASSISTANT

## 2025-06-03 PROCEDURE — 86762 RUBELLA ANTIBODY: CPT

## 2025-06-03 PROCEDURE — 86901 BLOOD TYPING SEROLOGIC RH(D): CPT

## 2025-06-03 PROCEDURE — 86704 HEP B CORE ANTIBODY TOTAL: CPT

## 2025-06-03 PROCEDURE — 86900 BLOOD TYPING SEROLOGIC ABO: CPT

## 2025-06-03 PROCEDURE — 85027 COMPLETE CBC AUTOMATED: CPT

## 2025-06-03 PROCEDURE — 83036 HEMOGLOBIN GLYCOSYLATED A1C: CPT

## 2025-06-03 PROCEDURE — 86850 RBC ANTIBODY SCREEN: CPT

## 2025-06-03 PROCEDURE — 86787 VARICELLA-ZOSTER ANTIBODY: CPT

## 2025-06-03 PROCEDURE — 86706 HEP B SURFACE ANTIBODY: CPT

## 2025-06-03 PROCEDURE — 87340 HEPATITIS B SURFACE AG IA: CPT

## 2025-06-03 ASSESSMENT — FIBROSIS 4 INDEX: FIB4 SCORE: 0.51

## 2025-06-03 NOTE — PROGRESS NOTES
S: 30 y.o.  at 17w4d presents for routine obstetric follow-up.   Absent fetal movement.  No contractions, vaginal bleeding, or leakage of fluid.    Questions answered.    O: BP 90/57   Wt 130 lb   LMP 01/10/2025 (Within Days)   BMI 25.39 kg/m²   Patients' weight gain, fluid intake and exercise level discussed.  Vitals, fundal height , fetal position, and FHR reviewed on flowsheet      A/P:  30 y.o.  at 17w4d presents for routine obstetric follow-up.  Size equals dates and/or scan    - Anatomy scan ordered   - PNL done today, no results yet  - Continue prenatal vitamins- pills not gummies.  - Exercise at least 30 minutes daily. Drink at least 3-4L of water daily      Follow-up in 4 weeks.    Dora Pinto P.A.-C.  [facility]

## 2025-06-03 NOTE — PROGRESS NOTES
Pt. Here for OB/FU.   Pt. Denies VB, LOF, or UC's.   Chaperone offered  Pt states no concerns   Phone/Pharm verified.

## 2025-06-05 ENCOUNTER — RESULTS FOLLOW-UP (OUTPATIENT)
Dept: OBGYN | Facility: CLINIC | Age: 30
End: 2025-06-05

## 2025-06-05 DIAGNOSIS — O99.019 ANEMIA DURING PREGNANCY: Primary | ICD-10-CM

## 2025-06-05 DIAGNOSIS — Z78.9 NONIMMUNE TO HEPATITIS B VIRUS: ICD-10-CM

## 2025-06-05 LAB — VZV IGG SER IA-ACNC: 3.25 S/CO

## 2025-06-05 RX ORDER — FERROUS SULFATE 325(65) MG
325 TABLET ORAL DAILY
Qty: 90 TABLET | Refills: 1 | Status: SHIPPED | OUTPATIENT
Start: 2025-06-05

## 2025-07-01 ENCOUNTER — APPOINTMENT (OUTPATIENT)
Dept: OBGYN | Facility: CLINIC | Age: 30
End: 2025-07-01
Payer: COMMERCIAL

## 2025-07-01 VITALS — SYSTOLIC BLOOD PRESSURE: 90 MMHG | BODY MASS INDEX: 26.03 KG/M2 | WEIGHT: 133.3 LBS | DIASTOLIC BLOOD PRESSURE: 53 MMHG

## 2025-07-01 DIAGNOSIS — Z34.81 ENCOUNTER FOR SUPERVISION OF OTHER NORMAL PREGNANCY, FIRST TRIMESTER: Primary | ICD-10-CM

## 2025-07-01 DIAGNOSIS — O99.019 ANEMIA DURING PREGNANCY: ICD-10-CM

## 2025-07-01 PROCEDURE — 3074F SYST BP LT 130 MM HG: CPT | Performed by: PHYSICIAN ASSISTANT

## 2025-07-01 PROCEDURE — 3078F DIAST BP <80 MM HG: CPT | Performed by: PHYSICIAN ASSISTANT

## 2025-07-01 PROCEDURE — 0502F SUBSEQUENT PRENATAL CARE: CPT | Performed by: PHYSICIAN ASSISTANT

## 2025-07-01 ASSESSMENT — FIBROSIS 4 INDEX: FIB4 SCORE: .5357142857142857143

## 2025-07-01 NOTE — PROGRESS NOTES
S: 30 y.o.  at 21w4d presents for routine obstetric follow-up.   Good fetal movement.  No contractions, vaginal bleeding, or leakage of fluid.    Questions answered.    O: BP 90/53   Wt 133 lb 4.8 oz   LMP 01/10/2025 (Within Days)   BMI 26.03 kg/m²   Patients' weight gain, fluid intake and exercise level discussed.  Vitals, fundal height , fetal position, and FHR reviewed on flowsheet      A/P:  30 y.o.  at 21w4d presents for routine obstetric follow-up.  Size equals dates and/or scan    - Recheck CBC due to anemia on PNL, started on iron supplement   - Did not complete early 1h GTT or Carrier screening. Will get this done.   - Anatomy scan scheduled 25  - Continue prenatal vitamins- pills not gummies.  - Exercise at least 30 minutes daily. Drink at least 3-4L of water daily      Follow-up in 4 weeks.    Dora Pinto P.A.-C.  [facility]

## 2025-07-01 NOTE — PROGRESS NOTES
Pt. Here for OB/FU.   Reports Good FM.   Pt. Denies VB, LOF, or UC's.   Chaperone offered  Pt states no concerns   Phone/Pharm verified.

## 2025-07-08 ENCOUNTER — HOSPITAL ENCOUNTER (OUTPATIENT)
Dept: RADIOLOGY | Facility: MEDICAL CENTER | Age: 30
End: 2025-07-08
Attending: PHYSICIAN ASSISTANT
Payer: COMMERCIAL

## 2025-07-08 DIAGNOSIS — Z34.81 ENCOUNTER FOR SUPERVISION OF OTHER NORMAL PREGNANCY, FIRST TRIMESTER: ICD-10-CM

## 2025-07-08 PROCEDURE — 76805 OB US >/= 14 WKS SNGL FETUS: CPT

## 2025-07-09 ENCOUNTER — RESULTS FOLLOW-UP (OUTPATIENT)
Dept: OBGYN | Facility: MEDICAL CENTER | Age: 30
End: 2025-07-09
Payer: COMMERCIAL

## 2025-07-29 ENCOUNTER — APPOINTMENT (OUTPATIENT)
Dept: OBGYN | Facility: CLINIC | Age: 30
End: 2025-07-29
Payer: COMMERCIAL

## 2025-07-29 NOTE — PROGRESS NOTES
Pt here for OB follow-up  Last seen on: 7/1 Dora   +FM ~ Educated on 10 movements in 2 hours if not straight to L&D  No VB, ESSENCE, [id number]  Phone: [phone]   Pharmacy verified   Rh type: O (+)  Pt states     Genetic testing results: NIPT low risk (female)     Labs:  Completed on 6/3/2025: anemia other wnl  Early 1 hr GTT: not completed   - mid tri labs ordered today     US:  Completed on 7/8/2025: anterior placenta with no previa (grade 1), fetal lie is breech. wnl    Vaccines:  Tdap: to be offered at next appointment (28 wks)  FLU: no longer indicated   RSV: no longer indicated

## 2025-08-04 ENCOUNTER — APPOINTMENT (OUTPATIENT)
Dept: OBGYN | Facility: CLINIC | Age: 30
End: 2025-08-04
Payer: COMMERCIAL

## 2025-08-27 ENCOUNTER — ROUTINE PRENATAL (OUTPATIENT)
Dept: OBGYN | Facility: CLINIC | Age: 30
End: 2025-08-27
Payer: COMMERCIAL

## 2025-08-27 VITALS — DIASTOLIC BLOOD PRESSURE: 55 MMHG | BODY MASS INDEX: 26.37 KG/M2 | WEIGHT: 135 LBS | SYSTOLIC BLOOD PRESSURE: 126 MMHG

## 2025-08-27 DIAGNOSIS — Z34.83 PRENATAL CARE, SUBSEQUENT PREGNANCY IN THIRD TRIMESTER: Primary | ICD-10-CM

## 2025-08-27 PROCEDURE — 3078F DIAST BP <80 MM HG: CPT | Performed by: OBSTETRICS & GYNECOLOGY

## 2025-08-27 PROCEDURE — 0502F SUBSEQUENT PRENATAL CARE: CPT | Performed by: OBSTETRICS & GYNECOLOGY

## 2025-08-27 PROCEDURE — 3074F SYST BP LT 130 MM HG: CPT | Performed by: OBSTETRICS & GYNECOLOGY

## 2025-08-27 ASSESSMENT — FIBROSIS 4 INDEX: FIB4 SCORE: .5357142857142857143
